# Patient Record
Sex: FEMALE | Race: WHITE | NOT HISPANIC OR LATINO | Employment: FULL TIME | URBAN - METROPOLITAN AREA
[De-identification: names, ages, dates, MRNs, and addresses within clinical notes are randomized per-mention and may not be internally consistent; named-entity substitution may affect disease eponyms.]

---

## 2017-04-03 ENCOUNTER — TRANSFERRED RECORDS (OUTPATIENT)
Dept: HEALTH INFORMATION MANAGEMENT | Facility: CLINIC | Age: 47
End: 2017-04-03

## 2017-04-03 LAB
HPV ABSTRACT: NORMAL
PAP-ABSTRACT: NORMAL

## 2017-11-06 ENCOUNTER — OFFICE VISIT (OUTPATIENT)
Dept: FAMILY MEDICINE | Facility: CLINIC | Age: 47
End: 2017-11-06
Payer: COMMERCIAL

## 2017-11-06 VITALS
HEART RATE: 67 BPM | HEIGHT: 63 IN | OXYGEN SATURATION: 96 % | SYSTOLIC BLOOD PRESSURE: 122 MMHG | RESPIRATION RATE: 18 BRPM | DIASTOLIC BLOOD PRESSURE: 77 MMHG | BODY MASS INDEX: 28.74 KG/M2 | TEMPERATURE: 98.4 F | WEIGHT: 162.2 LBS

## 2017-11-06 DIAGNOSIS — Z12.31 ENCOUNTER FOR SCREENING MAMMOGRAM FOR BREAST CANCER: ICD-10-CM

## 2017-11-06 DIAGNOSIS — Z12.83 SKIN CANCER SCREENING: ICD-10-CM

## 2017-11-06 DIAGNOSIS — Z13.6 CARDIOVASCULAR SCREENING; LDL GOAL LESS THAN 160: ICD-10-CM

## 2017-11-06 DIAGNOSIS — E03.9 HYPOTHYROIDISM, UNSPECIFIED TYPE: Primary | ICD-10-CM

## 2017-11-06 LAB
CHOLEST SERPL-MCNC: 210 MG/DL
GLUCOSE SERPL-MCNC: 79 MG/DL (ref 70–99)
HDLC SERPL-MCNC: 79 MG/DL
LDLC SERPL CALC-MCNC: 122 MG/DL
NONHDLC SERPL-MCNC: 131 MG/DL
TRIGL SERPL-MCNC: 43 MG/DL
TSH SERPL DL<=0.005 MIU/L-ACNC: 1.88 MU/L (ref 0.4–4)

## 2017-11-06 PROCEDURE — 99203 OFFICE O/P NEW LOW 30 MIN: CPT | Performed by: NURSE PRACTITIONER

## 2017-11-06 PROCEDURE — 80061 LIPID PANEL: CPT | Performed by: NURSE PRACTITIONER

## 2017-11-06 PROCEDURE — 82947 ASSAY GLUCOSE BLOOD QUANT: CPT | Performed by: NURSE PRACTITIONER

## 2017-11-06 PROCEDURE — 84443 ASSAY THYROID STIM HORMONE: CPT | Performed by: NURSE PRACTITIONER

## 2017-11-06 PROCEDURE — 36415 COLL VENOUS BLD VENIPUNCTURE: CPT | Performed by: NURSE PRACTITIONER

## 2017-11-06 RX ORDER — LEVOTHYROXINE SODIUM 112 UG/1
112 TABLET ORAL DAILY
COMMUNITY
Start: 2016-11-18 | End: 2017-11-06

## 2017-11-06 RX ORDER — LEVOTHYROXINE SODIUM 112 UG/1
112 TABLET ORAL DAILY
Qty: 90 TABLET | Refills: 3 | Status: SHIPPED | OUTPATIENT
Start: 2017-11-06 | End: 2018-12-20

## 2017-11-06 RX ORDER — LISINOPRIL 10 MG/1
10 TABLET ORAL DAILY
COMMUNITY
Start: 2016-11-18 | End: 2017-11-06

## 2017-11-06 NOTE — MR AVS SNAPSHOT
After Visit Summary   11/6/2017    Janine Beverly    MRN: [id number]           Patient Information     Date Of Birth          1970        Visit Information        Provider Department      11/6/2017 8:20 AM Natasha Ellis APRN CNP [facility]        Today's Diagnoses     Hypothyroidism, unspecified type    -  1    CARDIOVASCULAR SCREENING; LDL GOAL LESS THAN 160        Encounter for screening mammogram for breast cancer        Skin cancer screening           Follow-ups after your visit        Additional Services     DERMATOLOGY REFERRAL       Your provider has referred you to: [facility]: [facility] CHARLINE Mabry [phone]   [email or web address]    Please be aware that coverage of these services is subject to the terms and limitations of your health insurance plan.  Call member services at your health plan with any benefit or coverage questions.      Please bring the following with you to your appointment:    (1) Any X-Rays, CTs or MRIs which have been performed.  Contact the facility where they were done to arrange for  prior to your scheduled appointment.    (2) List of current medications  (3) This referral request   (4) Any documents/labs given to you for this referral                  Future tests that were ordered for you today     Open Future Orders        Priority Expected Expires Ordered    *MA Screening Digital Bilateral Routine  11/6/2018 11/6/2017            Who to contact     If you have questions or need follow up information about today's clinic visit or your schedule please contact [facility] directly at [phone].  Normal or non-critical lab and imaging results will be communicated to you by MyChart, letter or phone within 4 business days after the clinic has received the results. If you do not hear from us within 7 days, please contact the clinic through MyChart or phone. If you have a critical or abnormal lab  "result, we will notify you by phone as soon as possible.  Submit refill requests through Toplist or call your pharmacy and they will forward the refill request to us. Please allow 3 business days for your refill to be completed.          Additional Information About Your Visit        [facility] Information     Toplist lets you send messages to your doctor, view your test results, renew your prescriptions, schedule appointments and more. To sign up, go to [email or web address] . Click on \"Log in\" on the left side of the screen, which will take you to the Welcome page. Then click on \"Sign up Now\" on the right side of the page.     You will be asked to enter the access code listed below, as well as some personal information. Please follow the directions to create your username and password.     Your access code is: [email or web address]  Expires: 2018  8:48 AM     Your access code will  in 90 days. If you need help or a new code, please call your [place] clinic or [phone].        Care EveryWhere ID     This is your Care EveryWhere ID. This could be used by other organizations to access your [place] medical records  [id number]        Your Vitals Were     Pulse Temperature Respirations Height Pulse Oximetry BMI (Body Mass Index)    67 98.4  F (36.9  C) (Tympanic) 18 5' 3\" (1.6 m) 96% 28.73 kg/m2       Blood Pressure from Last 3 Encounters:   17 122/77   11 132/92   09 102/68    Weight from Last 3 Encounters:   17 162 lb 3.2 oz (73.6 kg)   11 155 lb (70.3 kg)              We Performed the Following     DERMATOLOGY REFERRAL     Glucose     Lipid panel reflex to direct LDL Fasting     TSH with free T4 reflex          Where to get your medicines      These medications were sent to [place] [id number] - [place], [place] -  [facility]   [address], [place] [address]     Phone:  [phone]     levothyroxine 112 MCG tablet          Primary Care Provider    Physician No Ref-Primary "       NO REF-PRIMARY PHYSICIAN        Equal Access to Services     NATIVIDAD GAMEZ : Hadii aad ku haddanielestrellita Coles, waaxda luqadaha, qaybta kaalmamalvin crockett, aleksandra baldwin. So [facility] [phone].    ATENCIÓN: Si habla español, tiene a pena disposición servicios gratuitos de asistencia lingüística. Llame al [phone].    We comply with applicable federal civil rights laws and [place] laws. We do not discriminate on the basis of race, color, national origin, age, disability, sex, sexual orientation, or gender identity.            Thank you!     Thank you for choosing [facility]  for your care. Our goal is always to provide you with excellent care. Hearing back from our patients is one way we can continue to improve our services. Please take a few minutes to complete the written survey that you may receive in the mail after your visit with us. Thank you!             Your Updated Medication List - Protect others around you: Learn how to safely use, store and throw away your medicines at www.disposemymeds.org.          This list is accurate as of: 11/6/17  8:48 AM.  Always use your most recent med list.                   Brand Name Dispense Instructions for use Diagnosis    levonorgestrel 20 MCG/24HR IUD    MIRENA     1 Device by Intrauterine route once        levothyroxine 112 MCG tablet    SYNTHROID    90 tablet    Take 1 tablet (112 mcg) by mouth daily    Hypothyroidism, unspecified type       lisinopril 10 MG tablet    PRINIVIL/ZESTRIL     1 TABLET DAILY

## 2017-11-06 NOTE — PROGRESS NOTES
"  SUBJECTIVE:   Janine Beverly is a 47 year old female who presents to clinic today for the following health issues:    New Patient/Transfer of Care - former care at [facility].  Had an insurance change.      Hypothyroid - has been diagnosed and stable on current dose for 8+ years.  Feeling well.  No symptoms or side effects.  Has not missed any doses.      Would like to get refills and referral for mammo    Pap with HPV (negative) done 10/2017 with mirena IUD insertion.      Due for fasting labs.      Problem list and histories reviewed & adjusted, as indicated.  Additional history: as documented    Reviewed and updated as needed this visit by clinical staff  Tobacco  Allergies  Meds  Problems  Med Hx  Surg Hx  Fam Hx  Soc Hx        Reviewed and updated as needed this visit by Provider  Tobacco  Allergies  Meds  Problems  Soc Hx        ROS:  Constitutional, HEENT, cardiovascular, pulmonary, gi and gu systems are negative, except as otherwise noted.      OBJECTIVE:   /77 (BP Location: Left arm, Patient Position: Chair, Cuff Size: Adult Regular)  Pulse 67  Temp 98.4  F (36.9  C) (Tympanic)  Resp 18  Ht 5' 3\" (1.6 m)  Wt 162 lb 3.2 oz (73.6 kg)  SpO2 96%  BMI 28.73 kg/m2  Body mass index is 28.73 kg/(m^2).  GENERAL: healthy, alert and no distress  EYES: Eyes grossly normal to inspection, PERRL and conjunctivae and sclerae normal  HENT: ear canals and TM's normal, nose and mouth without ulcers or lesions  NECK: no adenopathy, no asymmetry, masses, or scars and thyroid normal to palpation  RESP: lungs clear to auscultation - no rales, rhonchi or wheezes  CV: regular rate and rhythm, normal S1 S2, no S3 or S4, no murmur, click or rub, no peripheral edema and peripheral pulses strong  ABDOMEN: soft, nontender, no hepatosplenomegaly, no masses and bowel sounds normal  MS: no gross musculoskeletal defects noted, no edema  SKIN: no suspicious lesions or rashes    Diagnostic Test " Results:  Results for orders placed or performed in visit on 11/06/17   TSH with free T4 reflex   Result Value Ref Range    TSH 1.88 0.40 - 4.00 mU/L   Lipid panel reflex to direct LDL Fasting   Result Value Ref Range    Cholesterol 210 (H) <200 mg/dL    Triglycerides 43 <150 mg/dL    HDL Cholesterol 79 >49 mg/dL    LDL Cholesterol Calculated 122 (H) <100 mg/dL    Non HDL Cholesterol 131 (H) <130 mg/dL   Glucose   Result Value Ref Range    Glucose 79 70 - 99 mg/dL       ASSESSMENT/PLAN:     Hypothyroidism; controlled/euthyroid   Plan:  No changes in the patient's current treatment plan          ICD-10-CM    1. Hypothyroidism, unspecified type E03.9 levothyroxine (SYNTHROID) 112 MCG tablet     TSH with free T4 reflex   2. CARDIOVASCULAR SCREENING; LDL GOAL LESS THAN 160 Z13.6 Lipid panel reflex to direct LDL Fasting     Glucose   3. Encounter for screening mammogram for breast cancer Z12.31 *MA Screening Digital Bilateral   4. Skin cancer screening Z12.83 DERMATOLOGY REFERRAL     TSH at goal.  meds refilled x 1 year.  F/u  Sooner if worsening.      LDL and glucose at goal.      mammo recently done at [facility].      History of atyp nevus.  Would like to see derm for full skin check.    See Patient Instructions    NADIA Reyes [facility]

## 2017-11-06 NOTE — Clinical Note
Please abstract the following data from this visit with this patient into the appropriate field in Epic:  Pap smear done on this date: 04/2017 (approximately), by this group: [facility], results were Normal.

## 2017-11-08 ENCOUNTER — RADIANT APPOINTMENT (OUTPATIENT)
Dept: MAMMOGRAPHY | Facility: CLINIC | Age: 47
End: 2017-11-08
Attending: NURSE PRACTITIONER
Payer: COMMERCIAL

## 2017-11-08 DIAGNOSIS — Z12.31 ENCOUNTER FOR SCREENING MAMMOGRAM FOR BREAST CANCER: ICD-10-CM

## 2017-11-08 PROCEDURE — G0202 SCR MAMMO BI INCL CAD: HCPCS

## 2017-12-13 DIAGNOSIS — I10 ESSENTIAL HYPERTENSION WITH GOAL BLOOD PRESSURE LESS THAN 140/90: Primary | ICD-10-CM

## 2017-12-13 NOTE — TELEPHONE ENCOUNTER
lov 17  LISINOPRIL 10 MG OR TABS     --   Si TABLET DAILY   Class: Historical     kecia espinoza

## 2017-12-18 RX ORDER — LISINOPRIL 10 MG/1
TABLET ORAL
Qty: 90 TABLET | Refills: 0 | Status: SHIPPED | OUTPATIENT
Start: 2017-12-18 | End: 2018-03-19

## 2017-12-18 NOTE — TELEPHONE ENCOUNTER
Pt reported historical- lisinopril- but we got rx info from [facility]  Please sign off on lisinopril-    [facility] Received Sig: Take 1 tablet by mouth once daily.

## 2018-04-03 PROBLEM — E03.4 HYPOTHYROIDISM DUE TO ACQUIRED ATROPHY OF THYROID: Status: ACTIVE | Noted: 2018-04-03

## 2018-04-05 DIAGNOSIS — I10 ESSENTIAL HYPERTENSION WITH GOAL BLOOD PRESSURE LESS THAN 140/90: ICD-10-CM

## 2018-04-05 LAB
CREAT SERPL-MCNC: 0.72 MG/DL (ref 0.52–1.04)
GFR SERPL CREATININE-BSD FRML MDRD: 86 ML/MIN/1.7M2
POTASSIUM SERPL-SCNC: 3.8 MMOL/L (ref 3.4–5.3)

## 2018-04-05 PROCEDURE — 36415 COLL VENOUS BLD VENIPUNCTURE: CPT | Performed by: NURSE PRACTITIONER

## 2018-04-05 PROCEDURE — 82565 ASSAY OF CREATININE: CPT | Performed by: NURSE PRACTITIONER

## 2018-04-05 PROCEDURE — 84132 ASSAY OF SERUM POTASSIUM: CPT | Performed by: NURSE PRACTITIONER

## 2018-04-17 ENCOUNTER — OFFICE VISIT (OUTPATIENT)
Dept: FAMILY MEDICINE | Facility: CLINIC | Age: 48
End: 2018-04-17
Payer: COMMERCIAL

## 2018-04-17 VITALS
HEIGHT: 63 IN | HEART RATE: 54 BPM | OXYGEN SATURATION: 100 % | SYSTOLIC BLOOD PRESSURE: 121 MMHG | BODY MASS INDEX: 29.08 KG/M2 | WEIGHT: 164.13 LBS | RESPIRATION RATE: 16 BRPM | DIASTOLIC BLOOD PRESSURE: 79 MMHG | TEMPERATURE: 97 F

## 2018-04-17 DIAGNOSIS — M77.11 LATERAL EPICONDYLITIS OF RIGHT ELBOW: Primary | ICD-10-CM

## 2018-04-17 PROCEDURE — 99213 OFFICE O/P EST LOW 20 MIN: CPT | Performed by: FAMILY MEDICINE

## 2018-04-17 NOTE — MR AVS SNAPSHOT
"              After Visit Summary   2018    Janine Beverly    MRN: [id number]           Patient Information     Date Of Birth          1970        Visit Information        Provider Department      2018 3:20 PM Tsernig Choi MD [facility]        Today's Diagnoses     Lateral epicondylitis of right elbow    -  1       Follow-ups after your visit        Follow-up notes from your care team     Return if symptoms worsen or fail to improve.      Who to contact     If you have questions or need follow up information about today's clinic visit or your schedule please contact [facility] directly at [phone].  Normal or non-critical lab and imaging results will be communicated to you by MyChart, letter or phone within 4 business days after the clinic has received the results. If you do not hear from us within 7 days, please contact the clinic through MyChart or phone. If you have a critical or abnormal lab result, we will notify you by phone as soon as possible.  Submit refill requests through PatientKeeper or call your pharmacy and they will forward the refill request to us. Please allow 3 business days for your refill to be completed.          Additional Information About Your Visit        MyChart Information     PatientKeeper lets you send messages to your doctor, view your test results, renew your prescriptions, schedule appointments and more. To sign up, go to [email or web address] . Click on \"Log in\" on the left side of the screen, which will take you to the Welcome page. Then click on \"Sign up Now\" on the right side of the page.     You will be asked to enter the access code listed below, as well as some personal information. Please follow the directions to create your username and password.     Your access code is: [email or web address]  Expires: 2018  6:29 PM     Your access code will  in 90 days. If you need help or a new code, please call your " "[facility] or [phone].        Care EveryWhere ID     This is your Care EveryWhere ID. This could be used by other organizations to access your [place] medical records  [id number]        Your Vitals Were     Pulse Temperature Respirations Height Last Period Pulse Oximetry    54 97  F (36.1  C) (Oral) 16 5' 3\" (1.6 m) (LMP Unknown) 100%    Breastfeeding? BMI (Body Mass Index)                No 29.07 kg/m2           Blood Pressure from Last 3 Encounters:   04/17/18 121/79   11/06/17 122/77   11/13/11 132/92    Weight from Last 3 Encounters:   04/17/18 164 lb 2 oz (74.4 kg)   11/06/17 162 lb 3.2 oz (73.6 kg)   11/13/11 155 lb (70.3 kg)              Today, you had the following     No orders found for display       Primary Care Provider Office Phone # Fax #    Natasha Agarwal NADIA Ellis [facility] [phone] [address] [facility] [address]        Equal Access to Services     [facility]: Hadii aad ku hadasho Soomaali, waaxda luqadaha, qaybta kaalmada adeegyada, aleksandra eugene . So [facility] [phone].    ATENCIÓN: Si habla español, tiene a pena disposición servicios gratuitos de asistencia lingüística. [facility] [phone].    We comply with applicable federal civil rights laws and [place] laws. We do not discriminate on the basis of race, color, national origin, age, disability, sex, sexual orientation, or gender identity.            Thank you!     Thank you for choosing [facility]  for your care. Our goal is always to provide you with excellent care. Hearing back from our patients is one way we can continue to improve our services. Please take a few minutes to complete the written survey that you may receive in the mail after your visit with us. Thank you!             Your Updated Medication List - Protect others around you: Learn how to safely use, store and throw away your medicines at www.disposemymeds.org.          This list is " accurate as of 4/17/18 11:59 PM.  Always use your most recent med list.                   Brand Name Dispense Instructions for use Diagnosis    levonorgestrel 20 MCG/24HR IUD    MIRENA     1 Device by Intrauterine route once        levothyroxine 112 MCG tablet    SYNTHROID    90 tablet    Take 1 tablet (112 mcg) by mouth daily    Hypothyroidism, unspecified type       lisinopril 10 MG tablet    PRINIVIL/ZESTRIL    90 tablet    Take 1 tablet (10 mg) by mouth daily    Essential hypertension with goal blood pressure less than 140/90

## 2018-04-17 NOTE — PROGRESS NOTES
SUBJECTIVE:   Janine Beverly is a 47 year old female who presents to clinic today for the following health issues:    1. Pain in right elbow x 6 weeks, no known cause.     Presents with increased pain with twisting and turning motion of RIGHT forearm with pain- denies any injury or trauma.  Thinks it may have been called while exercising-- swims and lifts free weights and feels she may have done a lot of breast stroke movements with her arms just before this pain first started.  She has not tried any brace and is not using NSAIDs regularly.  Denies any swelling around elbow or skin changes.  No weakness or radicular symptoms into the hand.    Problem list and histories reviewed & adjusted, as indicated.  Additional history: as documented    Patient Active Problem List   Diagnosis     CARDIOVASCULAR SCREENING; LDL GOAL LESS THAN 160     Hypothyroidism due to acquired atrophy of thyroid     Past Surgical History:   Procedure Laterality Date     APPENDECTOMY  07/2003       Social History   Substance Use Topics     Smoking status: Never Smoker     Smokeless tobacco: Never Used     Alcohol use Yes     Family History   Problem Relation Age of Onset     Other - See Comments Mother 55     sarcoma     Breast Cancer Paternal Grandmother          Current Outpatient Prescriptions   Medication Sig Dispense Refill     lisinopril (PRINIVIL/ZESTRIL) 10 MG tablet Take 1 tablet (10 mg) by mouth daily 90 tablet 0     levonorgestrel (MIRENA) 20 MCG/24HR IUD 1 Device by Intrauterine route once       levothyroxine (SYNTHROID) 112 MCG tablet Take 1 tablet (112 mcg) by mouth daily 90 tablet 3     Allergies   Allergen Reactions     Penicillins Rash     BP Readings from Last 3 Encounters:   04/17/18 121/79   11/06/17 122/77   11/13/11 132/92    Wt Readings from Last 3 Encounters:   04/17/18 164 lb 2 oz (74.4 kg)   11/06/17 162 lb 3.2 oz (73.6 kg)   11/13/11 155 lb (70.3 kg)                    Reviewed and updated as needed this visit by  "clinical staff  Tobacco  Allergies  Meds  Med Hx  Surg Hx  Fam Hx  Soc Hx      Reviewed and updated as needed this visit by Provider         ROS:  Constitutional, HEENT, cardiovascular, pulmonary, gi and gu systems are negative, except as otherwise noted.    OBJECTIVE:     /79  Pulse 54  Temp 97  F (36.1  C) (Oral)  Resp 16  Ht 5' 3\" (1.6 m)  Wt 164 lb 2 oz (74.4 kg)  LMP  (LMP Unknown)  SpO2 100%  Breastfeeding? No  BMI 29.07 kg/m2  Body mass index is 29.07 kg/(m^2).  GENERAL: healthy, alert and no distress  EYES: Eyes grossly normal to inspection, PERRL and conjunctivae and sclerae normal  NECK: no adenopathy, no asymmetry, masses, or scars and thyroid normal to palpation  MS: no gross musculoskeletal defects noted, no edema, pain with twisting of right hand inverting against eversion of my handshake pressure with pain localized to the lateral epicondyle and forearm muscles just medially, no swelling or skin changes noted.  Normal  and strength.  SKIN: no suspicious lesions or rashes  NEURO: Normal strength and tone, mentation intact and speech normal  PSYCH: mentation appears normal, affect normal/bright    Diagnostic Test Results:  none     ASSESSMENT/PLAN:     1. Lateral epicondylitis of right elbow    Recommend tennis elbow brace and NSAIDS/ICE and rest over next 2-4 weeks until symptoms resolve.  Recommend avoiding repetitive activities with RUE which exacerbate the pain like twisting caps/lids and carrying heavy jugs of milk or water.      FU if no change or worsening symptoms prn.    Tsering Choi MD  [facility]  "

## 2018-06-16 DIAGNOSIS — I10 ESSENTIAL HYPERTENSION WITH GOAL BLOOD PRESSURE LESS THAN 140/90: ICD-10-CM

## 2018-06-17 NOTE — TELEPHONE ENCOUNTER
"Requested Prescriptions   Pending Prescriptions Disp Refills     lisinopril (PRINIVIL/ZESTRIL) 10 MG tablet [Pharmacy Med Name: LISINOPRIL 10 MG TABLET]      Last Written Prescription Date:  3/22/2018  Last Fill Quantity: 90 tablets   ,  # refills: 0   Last Office Visit: 4/17/2018   Future Office Visit:      90 tablet 0     Sig: TAKE 1 TABLET (10 MG) BY MOUTH DAILY    ACE Inhibitors (Including Combos) Protocol Passed    6/16/2018 12:34 PM       Passed - Blood pressure under 140/90 in past 12 months    BP Readings from Last 3 Encounters:   04/17/18 121/79   11/06/17 122/77   11/13/11 132/92          Passed - Recent (12 mo) or future (30 days) visit within the authorizing provider's specialty    Patient had office visit in the last 12 months or has a visit in the next 30 days with authorizing provider or within the authorizing provider's specialty.  See \"Patient Info\" tab in inbasket, or \"Choose Columns\" in Meds & Orders section of the refill encounter.           Passed - Patient is age 18 or older       Passed - No active pregnancy on record       Passed - Normal serum creatinine on file in past 12 months    Recent Labs   Lab Test  04/05/18   0836   CR  0.72          Passed - Normal serum potassium on file in past 12 months    Recent Labs   Lab Test  04/05/18   0836   POTASSIUM  3.8          Passed - No positive pregnancy test in past 12 months          "

## 2018-06-19 RX ORDER — LISINOPRIL 10 MG/1
TABLET ORAL
Qty: 90 TABLET | Refills: 0 | Status: SHIPPED | OUTPATIENT
Start: 2018-06-19 | End: 2018-06-29

## 2018-06-29 DIAGNOSIS — I10 ESSENTIAL HYPERTENSION WITH GOAL BLOOD PRESSURE LESS THAN 140/90: ICD-10-CM

## 2018-06-29 RX ORDER — LISINOPRIL 10 MG/1
TABLET ORAL
Qty: 90 TABLET | Refills: 0 | Status: SHIPPED | OUTPATIENT
Start: 2018-06-29 | End: 2018-12-21

## 2018-06-29 NOTE — TELEPHONE ENCOUNTER
Calling stating she did not get enough refills until her yearly visit in Nov 2018. Refills sent into pharmacy.  Ramona Hugo RN

## 2018-12-20 DIAGNOSIS — E03.9 HYPOTHYROIDISM, UNSPECIFIED TYPE: ICD-10-CM

## 2018-12-21 DIAGNOSIS — I10 ESSENTIAL HYPERTENSION WITH GOAL BLOOD PRESSURE LESS THAN 140/90: ICD-10-CM

## 2018-12-21 NOTE — TELEPHONE ENCOUNTER
"Requested Prescriptions   Pending Prescriptions Disp Refills     SYNTHROID 112 MCG tablet [Pharmacy Med Name: SYNTHROID 112 MCG TABLET]  Last Written Prescription Date:  11-6-17  Last Fill Quantity: 90 tab,  # refills: 3   Last office visit: 4/17/2018 with prescribing provider:  ALICE TOSCANO    Future Office Visit:    90 tablet 3     Sig: TAKE 1 TABLET (112 MCG) BY MOUTH DAILY    Thyroid Protocol Failed - 12/20/2018  1:52 AM       Failed - Normal TSH on file in past 12 months    Recent Labs   Lab Test 11/06/17  0851   TSH 1.88          Passed - Patient is 12 years or older       Passed - Recent (12 mo) or future (30 days) visit within the authorizing provider's specialty    Patient had office visit in the last 12 months or has a visit in the next 30 days with authorizing provider or within the authorizing provider's specialty.  See \"Patient Info\" tab in inbasket, or \"Choose Columns\" in Meds & Orders section of the refill encounter.           Passed - No active pregnancy on record    If patient is pregnant or has had a positive pregnancy test, please check TSH.       Passed - No positive pregnancy test in past 12 months    If patient is pregnant or has had a positive pregnancy test, please check TSH.          "

## 2018-12-22 NOTE — TELEPHONE ENCOUNTER
"Requested Prescriptions   Pending Prescriptions Disp Refills     lisinopril (PRINIVIL/ZESTRIL) 10 MG tablet [Pharmacy Med Name: LISINOPRIL 10 MG TABLET]  Last Written Prescription Date:  6/29/2018  Last Fill Quantity: 90 tabs,  # refills: 0   Last office visit: 4/17/2018 with prescribing provider:  Rhonda   Future Office Visit:     90 tablet 0     Sig: TAKE 1 TABLET BY MOUTH EVERY DAY    ACE Inhibitors (Including Combos) Protocol Passed - 12/21/2018  1:50 AM       Passed - Blood pressure under 140/90 in past 12 months    BP Readings from Last 3 Encounters:   04/17/18 121/79   11/06/17 122/77   11/13/11 132/92                Passed - Recent (12 mo) or future (30 days) visit within the authorizing provider's specialty    Patient had office visit in the last 12 months or has a visit in the next 30 days with authorizing provider or within the authorizing provider's specialty.  See \"Patient Info\" tab in inbasket, or \"Choose Columns\" in Meds & Orders section of the refill encounter.             Passed - Patient is age 18 or older       Passed - No active pregnancy on record       Passed - Normal serum creatinine on file in past 12 months    Recent Labs   Lab Test 04/05/18  0836   CR 0.72            Passed - Normal serum potassium on file in past 12 months    Recent Labs   Lab Test 04/05/18  0836   POTASSIUM 3.8            Passed - No positive pregnancy test in past 12 months          "

## 2018-12-23 RX ORDER — LISINOPRIL 10 MG/1
TABLET ORAL
Qty: 90 TABLET | Refills: 0 | Status: SHIPPED | OUTPATIENT
Start: 2018-12-23 | End: 2019-01-04

## 2018-12-23 RX ORDER — LEVOTHYROXINE SODIUM 112 MCG
TABLET ORAL
Qty: 30 TABLET | Refills: 0 | Status: SHIPPED | OUTPATIENT
Start: 2018-12-23 | End: 2019-01-04

## 2018-12-23 NOTE — TELEPHONE ENCOUNTER
Prescription approved per [facility] Refill Protocol.  GRACIELA Joshua, BSN, RN

## 2018-12-23 NOTE — TELEPHONE ENCOUNTER
One month refill only as patient overdue for thyroid lab check.    Lab ordered.    Team coordinators-Please contact patient to schedule non-fasting lab appt.    Thank you!  ARPIT YañezN, RN

## 2018-12-24 NOTE — TELEPHONE ENCOUNTER
Left message to call back. 1 month refill approved, patient is due for non-fasting lab for further refills.    Erendira Leong

## 2019-01-04 ENCOUNTER — OFFICE VISIT (OUTPATIENT)
Dept: FAMILY MEDICINE | Facility: CLINIC | Age: 49
End: 2019-01-04
Payer: COMMERCIAL

## 2019-01-04 VITALS
TEMPERATURE: 97.6 F | HEART RATE: 57 BPM | SYSTOLIC BLOOD PRESSURE: 105 MMHG | DIASTOLIC BLOOD PRESSURE: 67 MMHG | RESPIRATION RATE: 16 BRPM | HEIGHT: 63 IN | BODY MASS INDEX: 28.74 KG/M2 | WEIGHT: 162.2 LBS

## 2019-01-04 DIAGNOSIS — E03.9 HYPOTHYROIDISM, UNSPECIFIED TYPE: ICD-10-CM

## 2019-01-04 DIAGNOSIS — Z00.00 WELL FEMALE EXAM WITHOUT GYNECOLOGICAL EXAM: Primary | ICD-10-CM

## 2019-01-04 DIAGNOSIS — I10 ESSENTIAL HYPERTENSION WITH GOAL BLOOD PRESSURE LESS THAN 140/90: ICD-10-CM

## 2019-01-04 DIAGNOSIS — Z23 NEED FOR PROPHYLACTIC VACCINATION AND INOCULATION AGAINST INFLUENZA: ICD-10-CM

## 2019-01-04 PROCEDURE — 99396 PREV VISIT EST AGE 40-64: CPT | Mod: 25 | Performed by: NURSE PRACTITIONER

## 2019-01-04 PROCEDURE — 90471 IMMUNIZATION ADMIN: CPT | Performed by: NURSE PRACTITIONER

## 2019-01-04 PROCEDURE — 36415 COLL VENOUS BLD VENIPUNCTURE: CPT | Performed by: NURSE PRACTITIONER

## 2019-01-04 PROCEDURE — 80048 BASIC METABOLIC PNL TOTAL CA: CPT | Performed by: NURSE PRACTITIONER

## 2019-01-04 PROCEDURE — 90686 IIV4 VACC NO PRSV 0.5 ML IM: CPT | Performed by: NURSE PRACTITIONER

## 2019-01-04 PROCEDURE — 84443 ASSAY THYROID STIM HORMONE: CPT | Performed by: NURSE PRACTITIONER

## 2019-01-04 PROCEDURE — 80061 LIPID PANEL: CPT | Performed by: NURSE PRACTITIONER

## 2019-01-04 PROCEDURE — 82043 UR ALBUMIN QUANTITATIVE: CPT | Performed by: NURSE PRACTITIONER

## 2019-01-04 RX ORDER — LISINOPRIL 10 MG/1
TABLET ORAL
Qty: 90 TABLET | Refills: 3 | Status: SHIPPED | OUTPATIENT
Start: 2019-01-04 | End: 2020-02-04

## 2019-01-04 RX ORDER — LEVOTHYROXINE SODIUM 112 MCG
TABLET ORAL
Qty: 90 TABLET | Refills: 3 | Status: SHIPPED | OUTPATIENT
Start: 2019-01-04 | End: 2020-02-02

## 2019-01-04 ASSESSMENT — ENCOUNTER SYMPTOMS
NAUSEA: 0
MYALGIAS: 0
NERVOUS/ANXIOUS: 0
JOINT SWELLING: 0
CONSTIPATION: 0
EYE PAIN: 0
HEADACHES: 0
HEMATOCHEZIA: 0
WEAKNESS: 0
FREQUENCY: 0
ARTHRALGIAS: 0
HEMATURIA: 0
PALPITATIONS: 0
SHORTNESS OF BREATH: 0
CHILLS: 0
HEARTBURN: 0
BREAST MASS: 0
DIARRHEA: 0
FEVER: 0
SORE THROAT: 0
PARESTHESIAS: 0
DIZZINESS: 0
DYSURIA: 0
ABDOMINAL PAIN: 0
COUGH: 0

## 2019-01-04 ASSESSMENT — MIFFLIN-ST. JEOR: SCORE: 1333.82

## 2019-01-04 NOTE — PROGRESS NOTES
SUBJECTIVE:   CC: Janine Beverly is an 48 year old woman who presents for preventive health visit.     Physical   Annual:     Getting at least 3 servings of Calcium per day:  NO    Bi-annual eye exam:  Yes    Dental care twice a year:  Yes    Sleep apnea or symptoms of sleep apnea:  None    Diet:  Regular (no restrictions)    Frequency of exercise:  6-7 days/week    Duration of exercise:  45-60 minutes    Taking medications regularly:  Yes    Medication side effects:  None    Additional concerns today:  Yes (left shoulder pain [id number])    PHQ-2 Total Score: 0      Today's PHQ-2 Score:   PHQ-2 ( 1999 Pfizer) 1/4/2019   Q1: Little interest or pleasure in doing things 0   Q2: Feeling down, depressed or hopeless 0   PHQ-2 Score 0   Q1: Little interest or pleasure in doing things Not at all   Q2: Feeling down, depressed or hopeless Not at all   PHQ-2 Score 0       Abuse: Current or Past(Physical, Sexual or Emotional)- No  Do you feel safe in your environment? Yes    Social History     Tobacco Use     Smoking status: Never Smoker     Smokeless tobacco: Never Used   Substance Use Topics     Alcohol use: Yes     Alcohol Use 1/4/2019   If you drink alcohol do you typically have greater than 3 drinks per day OR greater than 7 drinks per week? No     Reviewed orders with patient.  Reviewed health maintenance and updated orders accordingly - Yes    Mammogram Screening: Patient under age 50, mutual decision reflected in health maintenance.      Pertinent mammograms are reviewed under the imaging tab.  History of abnormal Pap smear: NO - age 30-65 PAP every 5 years with negative HPV co-testing recommended     Reviewed and updated as needed this visit by clinical staff  Tobacco  Allergies  Meds  Problems  Med Hx  Surg Hx  Fam Hx  Soc Hx          Reviewed and updated as needed this visit by Provider  Tobacco  Allergies  Meds  Problems  Med Hx  Surg Hx  Fam Hx          Review of Systems   Constitutional: Negative  "for chills and fever.   HENT: Negative for congestion, ear pain, hearing loss and sore throat.    Eyes: Negative for pain and visual disturbance.   Respiratory: Negative for cough and shortness of breath.    Cardiovascular: Negative for chest pain, palpitations and peripheral edema.   Gastrointestinal: Negative for abdominal pain, constipation, diarrhea, heartburn, hematochezia and nausea.   Breasts:  Negative for tenderness, breast mass and discharge.   Genitourinary: Negative for dysuria, frequency, genital sores, hematuria, pelvic pain, urgency, vaginal bleeding and vaginal discharge.   Musculoskeletal: Negative for arthralgias, joint swelling and myalgias.   Skin: Negative for rash.   Neurological: Negative for dizziness, weakness, headaches and paresthesias.   Psychiatric/Behavioral: Negative for mood changes. The patient is not nervous/anxious.        OBJECTIVE:   /67   Pulse 57   Temp 97.6  F (36.4  C) (Oral)   Resp 16   Ht 1.607 m (5' 3.25\")   Wt 73.6 kg (162 lb 3.2 oz)   BMI 28.51 kg/m    Physical Exam  GENERAL: healthy, alert and no distress  EYES: Eyes grossly normal to inspection, PERRL and conjunctivae and sclerae normal  HENT: ear canals and TM's normal, nose and mouth without ulcers or lesions  NECK: no adenopathy, no asymmetry, masses, or scars and thyroid normal to palpation  RESP: lungs clear to auscultation - no rales, rhonchi or wheezes  BREAST: normal without masses, tenderness or nipple discharge and no palpable axillary masses or adenopathy  CV: regular rate and rhythm, normal S1 S2, no S3 or S4, no murmur, click or rub, no peripheral edema and peripheral pulses strong  ABDOMEN: soft, nontender, no hepatosplenomegaly, no masses and bowel sounds normal  MS: no gross musculoskeletal defects noted, no edema  SKIN: no suspicious lesions or rashes  NEURO: Normal strength and tone, mentation intact and speech normal  PSYCH: mentation appears normal, affect " "normal/bright      ASSESSMENT/PLAN:       ICD-10-CM    1. Well female exam without gynecological exam Z00.00 Lipid panel reflex to direct LDL Non-fasting   2. Essential hypertension with goal blood pressure less than 140/90 I10 lisinopril (PRINIVIL/ZESTRIL) 10 MG tablet     Basic metabolic panel  (Ca, Cl, CO2, Creat, Gluc, K, Na, BUN)     Albumin Random Urine Quantitative with Creat Ratio   3. Hypothyroidism, unspecified type E03.9 SYNTHROID 112 MCG tablet     TSH with free T4 reflex   4. Need for prophylactic vaccination and inoculation against influenza Z23 Vaccine Administration, Initial [18298]     FLU VACCINE, SPLIT VIRUS, IM (QUADRIVALENT) [72627]- >3 YRS      well female, not fasting for labs.  Encouraged to continue exercise and healthy diet choices.      Discussed sodium restriction, maintaining ideal body weight and regular exercise program as physiologic means to achieve blood pressure control. The patient will strive towards this.  The patient indicates understanding of these issues and agrees with the plan. Side effects explained in detail. Continue home readings and return in 12 months.  Discussed long term complications of untreated htn.    Thyroid feels stable.  Same dose for many years.  Will refill at current dose.  F/u if TSH abnormal.    Flu shot given.    COUNSELING:  Reviewed preventive health counseling, as reflected in patient instructions    BP Readings from Last 1 Encounters:   01/04/19 105/67     Estimated body mass index is 28.51 kg/m  as calculated from the following:    Height as of this encounter: 1.607 m (5' 3.25\").    Weight as of this encounter: 73.6 kg (162 lb 3.2 oz).      Weight management plan: Specific weight management program called whole 30 discussed     reports that  has never smoked. she has never used smokeless tobacco.      Counseling Resources:  ATP IV Guidelines  Pooled Cohorts Equation Calculator  Breast Cancer Risk Calculator  FRAX Risk Assessment  ICSI Preventive " Guidelines  Dietary Guidelines for Americans, 2010  USDA's MyPlate  ASA Prophylaxis  Lung CA Screening    NADIA Reyes CNP  [facility]  Injectable Influenza Immunization Documentation    1.  Is the person to be vaccinated sick today?   No    2. Does the person to be vaccinated have an allergy to a component   of the vaccine?   No  Egg Allergy Algorithm Link    3. Has the person to be vaccinated ever had a serious reaction   to influenza vaccine in the past?   No    4. Has the person to be vaccinated ever had Guillain-Barré syndrome?   No    Form completed by Becky Najera MA

## 2019-01-05 LAB
ANION GAP SERPL CALCULATED.3IONS-SCNC: 6 MMOL/L (ref 3–14)
BUN SERPL-MCNC: 23 MG/DL (ref 7–30)
CALCIUM SERPL-MCNC: 8.8 MG/DL (ref 8.5–10.1)
CHLORIDE SERPL-SCNC: 102 MMOL/L (ref 94–109)
CHOLEST SERPL-MCNC: 268 MG/DL
CO2 SERPL-SCNC: 26 MMOL/L (ref 20–32)
CREAT SERPL-MCNC: 0.92 MG/DL (ref 0.52–1.04)
CREAT UR-MCNC: 101 MG/DL
GFR SERPL CREATININE-BSD FRML MDRD: 73 ML/MIN/{1.73_M2}
GLUCOSE SERPL-MCNC: 84 MG/DL (ref 70–99)
HDLC SERPL-MCNC: 76 MG/DL
LDLC SERPL CALC-MCNC: 176 MG/DL
MICROALBUMIN UR-MCNC: 6 MG/L
MICROALBUMIN/CREAT UR: 5.73 MG/G CR (ref 0–25)
NONHDLC SERPL-MCNC: 192 MG/DL
POTASSIUM SERPL-SCNC: 4.2 MMOL/L (ref 3.4–5.3)
SODIUM SERPL-SCNC: 134 MMOL/L (ref 133–144)
TRIGL SERPL-MCNC: 78 MG/DL
TSH SERPL DL<=0.005 MIU/L-ACNC: 2.07 MU/L (ref 0.4–4)

## 2019-03-14 ENCOUNTER — ANCILLARY PROCEDURE (OUTPATIENT)
Dept: MAMMOGRAPHY | Facility: CLINIC | Age: 49
End: 2019-03-14
Attending: NURSE PRACTITIONER
Payer: COMMERCIAL

## 2019-03-14 DIAGNOSIS — Z12.31 SCREENING MAMMOGRAM, ENCOUNTER FOR: ICD-10-CM

## 2020-01-29 DIAGNOSIS — E03.9 HYPOTHYROIDISM, UNSPECIFIED TYPE: ICD-10-CM

## 2020-01-30 NOTE — TELEPHONE ENCOUNTER
"Requested Prescriptions   Pending Prescriptions Disp Refills     SYNTHROID 112 MCG tablet [Pharmacy Med Name: SYNTHROID 112 MCG TABLET] 90 tablet 3     Sig: TAKE 1 TABLET (112 MCG) BY MOUTH DAILY      Last Written Prescription Date:  1/4/2019  Last Fill Quantity: 90 tablet    ,  # refills: 3   Last Office Visit: 1/4/2019   Future Office Visit:    Next 5 appointments (look out 90 days)    Feb 04, 2020  9:00 AM CST  PHYSICAL with NADIA Reyes CNP  [facility] ([facility]) [address] [place] [address]  [phone]             Thyroid Protocol Failed - 1/29/2020  2:24 AM        Failed - Normal TSH on file in past 12 months     Recent Labs   Lab Test 01/04/19  1447   TSH 2.07           Passed - Patient is 12 years or older        Passed - Recent (12 mo) or future (30 days) visit within the authorizing provider's specialty     Patient has had an office visit with the authorizing provider or a provider within the authorizing providers department within the previous 12 mos or has a future within next 30 days. See \"Patient Info\" tab in inbasket, or \"Choose Columns\" in Meds & Orders section of the refill encounter.          Passed - Medication is active on med list        Passed - No active pregnancy on record     If patient is pregnant or has had a positive pregnancy test, please check TSH.        Passed - No positive pregnancy test in past 12 months     If patient is pregnant or has had a positive pregnancy test, please check TSH.          "

## 2020-02-02 RX ORDER — LEVOTHYROXINE SODIUM 112 MCG
TABLET ORAL
Qty: 30 TABLET | Refills: 0 | Status: SHIPPED | OUTPATIENT
Start: 2020-02-02 | End: 2020-02-04

## 2020-02-03 NOTE — TELEPHONE ENCOUNTER
HP Reception Medication is being filled for 1 time refill only due to:  Patient needs to be seen because it has been more than one year since last visit.     Signed Prescriptions:                        Disp   Refills    SYNTHROID 112 MCG tablet                   30 tab*0        Sig: TAKE 1 TABLET (112 MCG) BY MOUTH DAILY  Authorizing Provider: YADI GODWIN  Ordering User: WILDER LEE

## 2020-02-03 NOTE — TELEPHONE ENCOUNTER
Patient is scheduled to see PCP tomorrow & can get labs done then.    Erendira ABBASI     [facility]

## 2020-02-04 ENCOUNTER — OFFICE VISIT (OUTPATIENT)
Dept: FAMILY MEDICINE | Facility: CLINIC | Age: 50
End: 2020-02-04
Payer: COMMERCIAL

## 2020-02-04 VITALS
WEIGHT: 171.2 LBS | BODY MASS INDEX: 30.33 KG/M2 | SYSTOLIC BLOOD PRESSURE: 123 MMHG | OXYGEN SATURATION: 100 % | DIASTOLIC BLOOD PRESSURE: 81 MMHG | RESPIRATION RATE: 16 BRPM | TEMPERATURE: 98.2 F | HEART RATE: 49 BPM | HEIGHT: 63 IN

## 2020-02-04 DIAGNOSIS — E03.9 HYPOTHYROIDISM, UNSPECIFIED TYPE: ICD-10-CM

## 2020-02-04 DIAGNOSIS — Z23 NEED FOR PROPHYLACTIC VACCINATION AND INOCULATION AGAINST INFLUENZA: ICD-10-CM

## 2020-02-04 DIAGNOSIS — Z00.00 WELL ADULT HEALTH CHECK: Primary | ICD-10-CM

## 2020-02-04 DIAGNOSIS — I10 ESSENTIAL HYPERTENSION WITH GOAL BLOOD PRESSURE LESS THAN 140/90: ICD-10-CM

## 2020-02-04 DIAGNOSIS — Z12.11 SCREEN FOR COLON CANCER: ICD-10-CM

## 2020-02-04 DIAGNOSIS — Z11.4 ENCOUNTER FOR SCREENING FOR HIV: ICD-10-CM

## 2020-02-04 LAB
CREAT UR-MCNC: 53 MG/DL
MICROALBUMIN UR-MCNC: <5 MG/L
MICROALBUMIN/CREAT UR: NORMAL MG/G CR (ref 0–25)

## 2020-02-04 PROCEDURE — 90682 RIV4 VACC RECOMBINANT DNA IM: CPT | Performed by: NURSE PRACTITIONER

## 2020-02-04 PROCEDURE — 99396 PREV VISIT EST AGE 40-64: CPT | Mod: 25 | Performed by: NURSE PRACTITIONER

## 2020-02-04 PROCEDURE — 80061 LIPID PANEL: CPT | Performed by: NURSE PRACTITIONER

## 2020-02-04 PROCEDURE — 36415 COLL VENOUS BLD VENIPUNCTURE: CPT | Performed by: NURSE PRACTITIONER

## 2020-02-04 PROCEDURE — 87389 HIV-1 AG W/HIV-1&-2 AB AG IA: CPT | Performed by: NURSE PRACTITIONER

## 2020-02-04 PROCEDURE — 80053 COMPREHEN METABOLIC PANEL: CPT | Performed by: NURSE PRACTITIONER

## 2020-02-04 PROCEDURE — 84443 ASSAY THYROID STIM HORMONE: CPT | Performed by: NURSE PRACTITIONER

## 2020-02-04 PROCEDURE — 82043 UR ALBUMIN QUANTITATIVE: CPT | Performed by: NURSE PRACTITIONER

## 2020-02-04 PROCEDURE — 99213 OFFICE O/P EST LOW 20 MIN: CPT | Mod: 25 | Performed by: NURSE PRACTITIONER

## 2020-02-04 PROCEDURE — 90471 IMMUNIZATION ADMIN: CPT | Performed by: NURSE PRACTITIONER

## 2020-02-04 RX ORDER — LISINOPRIL 10 MG/1
TABLET ORAL
Qty: 90 TABLET | Refills: 3 | Status: SHIPPED | OUTPATIENT
Start: 2020-02-04 | End: 2021-03-19

## 2020-02-04 RX ORDER — LEVOTHYROXINE SODIUM 112 MCG
TABLET ORAL
Qty: 90 TABLET | Refills: 3 | Status: SHIPPED | OUTPATIENT
Start: 2020-02-04 | End: 2021-02-24

## 2020-02-04 ASSESSMENT — ENCOUNTER SYMPTOMS
FEVER: 0
HEMATOCHEZIA: 0
CHILLS: 0
DIZZINESS: 0
ABDOMINAL PAIN: 0
NERVOUS/ANXIOUS: 0
COUGH: 0
DIARRHEA: 0
EYE PAIN: 0
CONSTIPATION: 0
FREQUENCY: 0
HEMATURIA: 0

## 2020-02-04 ASSESSMENT — MIFFLIN-ST. JEOR: SCORE: 1365.69

## 2020-02-04 NOTE — PROGRESS NOTES
SUBJECTIVE:   CC: Janine Beverly is an 49 year old woman who presents for preventive health visit.     Healthy Habits:     Getting at least 3 servings of Calcium per day:  NO    Bi-annual eye exam:  Yes    Dental care twice a year:  Yes    Sleep apnea or symptoms of sleep apnea:  None    Diet:  Regular (no restrictions)    Frequency of exercise:  4-5 days/week    Duration of exercise:  45-60 minutes    Taking medications regularly:  Yes    Medication side effects:  None    PHQ-2 Total Score: 0    Additional concerns today:  Yes      Pain in left leg X2 weeks   Thinks it may be related to working out  Felt better after she skipped her workout x 2 days  No swelling/bleeding or bruising  Feels like a pulled muscle    htn refill  Feeling well  Not having any side effects  Due for refills  Wants to stay on this medication/dose    Thyroid refill  Due for labs  Feeling well  Has not missed any doses.    Wants to stay on the same dose.      Today's PHQ-2 Score:   PHQ-2 ( 1999 Pfizer) 2/4/2020   Q1: Little interest or pleasure in doing things 0   Q2: Feeling down, depressed or hopeless 0   PHQ-2 Score 0   Q1: Little interest or pleasure in doing things Not at all   Q2: Feeling down, depressed or hopeless Not at all   PHQ-2 Score 0       Abuse: Current or Past(Physical, Sexual or Emotional)- No  Do you feel safe in your environment? Yes        Social History     Tobacco Use     Smoking status: Never Smoker     Smokeless tobacco: Never Used   Substance Use Topics     Alcohol use: Yes         Alcohol Use 2/4/2020   Prescreen: >3 drinks/day or >7 drinks/week? No     Reviewed orders with patient.  Reviewed health maintenance and updated orders accordingly - Yes    Pertinent mammograms are reviewed under the imaging tab.  History of abnormal Pap smear: NO - age 30-65 PAP every 5 years with negative HPV co-testing recommended     Reviewed and updated as needed this visit by clinical staff  Tobacco  Allergies  Meds  Problems  " Med Hx  Surg Hx  Fam Hx         Reviewed and updated as needed this visit by Provider  Tobacco  Allergies  Meds  Problems  Med Hx  Surg Hx  Fam Hx          Review of Systems   Constitutional: Negative for chills and fever.   HENT: Negative for congestion and ear pain.    Eyes: Negative for pain.   Respiratory: Negative for cough.    Cardiovascular: Negative for chest pain.   Gastrointestinal: Negative for abdominal pain, constipation, diarrhea and hematochezia.   Genitourinary: Negative for frequency and hematuria.   Neurological: Negative for dizziness.   Psychiatric/Behavioral: The patient is not nervous/anxious.         OBJECTIVE:   /81   Pulse (!) 49   Temp 98.2  F (36.8  C) (Oral)   Resp 16   Ht 1.6 m (5' 3\")   Wt 77.7 kg (171 lb 3.2 oz)   SpO2 100%   BMI 30.33 kg/m    Physical Exam  GENERAL: healthy, alert and no distress  EYES: Eyes grossly normal to inspection, PERRL and conjunctivae and sclerae normal  HENT: ear canals and TM's normal, nose and mouth without ulcers or lesions  NECK: no adenopathy, no asymmetry, masses, or scars and thyroid normal to palpation  RESP: lungs clear to auscultation - no rales, rhonchi or wheezes  BREAST: normal without masses, tenderness or nipple discharge and no palpable axillary masses or adenopathy  CV: regular rate and rhythm, normal S1 S2, no S3 or S4, no murmur, click or rub, no peripheral edema and peripheral pulses strong  ABDOMEN: soft, nontender, no hepatosplenomegaly, no masses and bowel sounds normal  MS: no gross musculoskeletal defects noted, no edema  SKIN: no suspicious lesions or rashes  NEURO: Normal strength and tone, mentation intact and speech normal  PSYCH: mentation appears normal, affect normal/bright    Results for orders placed or performed in visit on 02/04/20   HIV Screening     Status: None   Result Value Ref Range    HIV Antigen Antibody Combo Nonreactive NR^Nonreactive       TSH with free T4 reflex     Status: None "   Result Value Ref Range    TSH 1.61 0.40 - 4.00 mU/L   Lipid panel reflex to direct LDL Fasting     Status: Abnormal   Result Value Ref Range    Cholesterol 233 (H) <200 mg/dL    Triglycerides 70 <150 mg/dL    HDL Cholesterol 75 >49 mg/dL    LDL Cholesterol Calculated 144 (H) <100 mg/dL    Non HDL Cholesterol 158 (H) <130 mg/dL   Comprehensive metabolic panel     Status: None   Result Value Ref Range    Sodium 137 133 - 144 mmol/L    Potassium 4.0 3.4 - 5.3 mmol/L    Chloride 107 94 - 109 mmol/L    Carbon Dioxide 25 20 - 32 mmol/L    Anion Gap 5 3 - 14 mmol/L    Glucose 87 70 - 99 mg/dL    Urea Nitrogen 13 7 - 30 mg/dL    Creatinine 0.72 0.52 - 1.04 mg/dL    GFR Estimate >90 >60 mL/min/[1.73_m2]    GFR Estimate If Black >90 >60 mL/min/[1.73_m2]    Calcium 8.5 8.5 - 10.1 mg/dL    Bilirubin Total 0.4 0.2 - 1.3 mg/dL    Albumin 3.7 3.4 - 5.0 g/dL    Protein Total 6.9 6.8 - 8.8 g/dL    Alkaline Phosphatase 49 40 - 150 U/L    ALT 18 0 - 50 U/L    AST 16 0 - 45 U/L   Albumin Random Urine Quantitative with Creat Ratio     Status: None   Result Value Ref Range    Creatinine Urine 53 mg/dL    Albumin Urine mg/L <5 mg/L    Albumin Urine mg/g Cr Unable to calculate due to low value 0 - 25 mg/g Cr       ASSESSMENT/PLAN:       ICD-10-CM    1. Well adult health check Z00.00    2. Hypothyroidism, unspecified type E03.9 SYNTHROID 112 MCG tablet     TSH with free T4 reflex   3. Screen for colon cancer Z12.11    4. Need for prophylactic vaccination and inoculation against influenza Z23 INFLUENZA QUAD, RECOMBINANT, P-FREE (RIV4) (FLUBLOCK) [44273]     Vaccine Administration, Initial [19521]   5. Essential hypertension with goal blood pressure less than 140/90 I10 Lipid panel reflex to direct LDL Fasting     Comprehensive metabolic panel     Albumin Random Urine Quantitative with Creat Ratio     lisinopril (PRINIVIL/ZESTRIL) 10 MG tablet   6. Encounter for screening for HIV Z11.4 HIV Screening      well female, not fasting for labs.   "Encouraged to continue exercise and healthy diet choices.      TSH at goal.    Refill at current dose.    F/u in 1 year or sooner if she should feel symptomatic.      BP at goal  Labs stable  Refill at current dose and f/u in 1 year or sooner if BP's are elevated.        COUNSELING:  Reviewed preventive health counseling, as reflected in patient instructions    Estimated body mass index is 28.51 kg/m  as calculated from the following:    Height as of 1/4/19: 1.607 m (5' 3.25\").    Weight as of 1/4/19: 73.6 kg (162 lb 3.2 oz).         reports that she has never smoked. She has never used smokeless tobacco.    Counseling Resources:  ATP IV Guidelines  Pooled Cohorts Equation Calculator  Breast Cancer Risk Calculator  FRAX Risk Assessment  ICSI Preventive Guidelines  Dietary Guidelines for Americans, 2010  USDA's MyPlate  ASA Prophylaxis  Lung CA Screening    NADIA Reyes CNP  [facility]  "

## 2020-02-04 NOTE — LETTER
February 13, 2020      Janine Beverly  [address] [place] [address]        Dear ,    We are writing to inform you of your test results.    Your LDL cholesterol and glucose are normal.  Thyroid, metabolic panel and HIV tests are normal.         Please let me know if you have any questions.     Resulted Orders   HIV Screening   Result Value Ref Range    HIV Antigen Antibody Combo Nonreactive NR^Nonreactive          Comment:      HIV-1 p24 Ag & HIV-1/HIV-2 Ab Not Detected   TSH with free T4 reflex   Result Value Ref Range    TSH 1.61 0.40 - 4.00 mU/L   Lipid panel reflex to direct LDL Fasting   Result Value Ref Range    Cholesterol 233 (H) <200 mg/dL      Comment:      Desirable:       <200 mg/dl    Triglycerides 70 <150 mg/dL      Comment:      Fasting specimen    HDL Cholesterol 75 >49 mg/dL    LDL Cholesterol Calculated 144 (H) <100 mg/dL      Comment:      Above desirable:  100-129 mg/dl  Borderline High:  130-159 mg/dL  High:             160-189 mg/dL  Very high:       >189 mg/dl      Non HDL Cholesterol 158 (H) <130 mg/dL      Comment:      Above Desirable:  130-159 mg/dl  Borderline high:  160-189 mg/dl  High:             190-219 mg/dl  Very high:       >219 mg/dl     Comprehensive metabolic panel   Result Value Ref Range    Sodium 137 133 - 144 mmol/L    Potassium 4.0 3.4 - 5.3 mmol/L    Chloride 107 94 - 109 mmol/L    Carbon Dioxide 25 20 - 32 mmol/L    Anion Gap 5 3 - 14 mmol/L    Glucose 87 70 - 99 mg/dL      Comment:      Fasting specimen    Urea Nitrogen 13 7 - 30 mg/dL    Creatinine 0.72 0.52 - 1.04 mg/dL    GFR Estimate >90 >60 mL/min/[1.73_m2]      Comment:      Non  GFR Calc  Starting 12/18/2018, serum creatinine based estimated GFR (eGFR) will be   calculated using the Chronic Kidney Disease Epidemiology Collaboration   (CKD-EPI) equation.      GFR Estimate If Black >90 >60 mL/min/[1.73_m2]      Comment:       GFR Calc  Starting 12/18/2018,  serum creatinine based estimated GFR (eGFR) will be   calculated using the Chronic Kidney Disease Epidemiology Collaboration   (CKD-EPI) equation.      Calcium 8.5 8.5 - 10.1 mg/dL    Bilirubin Total 0.4 0.2 - 1.3 mg/dL    Albumin 3.7 3.4 - 5.0 g/dL    Protein Total 6.9 6.8 - 8.8 g/dL    Alkaline Phosphatase 49 40 - 150 U/L    ALT 18 0 - 50 U/L    AST 16 0 - 45 U/L   Albumin Random Urine Quantitative with Creat Ratio   Result Value Ref Range    Creatinine Urine 53 mg/dL    Albumin Urine mg/L <5 mg/L    Albumin Urine mg/g Cr Unable to calculate due to low value 0 - 25 mg/g Cr     If you have any questions or concerns, please call the clinic at the number listed above.   Sincerely,  Natasha Ellis, APRN CNP/nr

## 2020-02-05 LAB
ALBUMIN SERPL-MCNC: 3.7 G/DL (ref 3.4–5)
ALP SERPL-CCNC: 49 U/L (ref 40–150)
ALT SERPL W P-5'-P-CCNC: 18 U/L (ref 0–50)
ANION GAP SERPL CALCULATED.3IONS-SCNC: 5 MMOL/L (ref 3–14)
AST SERPL W P-5'-P-CCNC: 16 U/L (ref 0–45)
BILIRUB SERPL-MCNC: 0.4 MG/DL (ref 0.2–1.3)
BUN SERPL-MCNC: 13 MG/DL (ref 7–30)
CALCIUM SERPL-MCNC: 8.5 MG/DL (ref 8.5–10.1)
CHLORIDE SERPL-SCNC: 107 MMOL/L (ref 94–109)
CHOLEST SERPL-MCNC: 233 MG/DL
CO2 SERPL-SCNC: 25 MMOL/L (ref 20–32)
CREAT SERPL-MCNC: 0.72 MG/DL (ref 0.52–1.04)
GFR SERPL CREATININE-BSD FRML MDRD: >90 ML/MIN/{1.73_M2}
GLUCOSE SERPL-MCNC: 87 MG/DL (ref 70–99)
HDLC SERPL-MCNC: 75 MG/DL
HIV 1+2 AB+HIV1 P24 AG SERPL QL IA: NONREACTIVE
LDLC SERPL CALC-MCNC: 144 MG/DL
NONHDLC SERPL-MCNC: 158 MG/DL
POTASSIUM SERPL-SCNC: 4 MMOL/L (ref 3.4–5.3)
PROT SERPL-MCNC: 6.9 G/DL (ref 6.8–8.8)
SODIUM SERPL-SCNC: 137 MMOL/L (ref 133–144)
TRIGL SERPL-MCNC: 70 MG/DL
TSH SERPL DL<=0.005 MIU/L-ACNC: 1.61 MU/L (ref 0.4–4)

## 2020-02-20 ENCOUNTER — TELEPHONE (OUTPATIENT)
Dept: FAMILY MEDICINE | Facility: CLINIC | Age: 50
End: 2020-02-20

## 2020-02-20 NOTE — TELEPHONE ENCOUNTER
Patient called stating she was seen on 2/4/20 and Cologuard was ordered however, pt states she received a letter saying they need additional info.      Patient was told they have contacted us and waiting for a reply as they are unable to send the cologuard to pt     Please update patient on status

## 2020-02-25 NOTE — TELEPHONE ENCOUNTER
LM informing patient that writer spoke with [employer] & they just needed the ICD-10 code.     They'll ship the cologuard out to patient within 5-7 business days.    Erendira ABBASI     [facility]

## 2020-03-04 ENCOUNTER — TRANSFERRED RECORDS (OUTPATIENT)
Dept: HEALTH INFORMATION MANAGEMENT | Facility: CLINIC | Age: 50
End: 2020-03-04

## 2020-03-04 LAB — COLOGUARD-ABSTRACT: NEGATIVE

## 2020-03-16 ENCOUNTER — ANCILLARY PROCEDURE (OUTPATIENT)
Dept: MAMMOGRAPHY | Facility: CLINIC | Age: 50
End: 2020-03-16
Attending: NURSE PRACTITIONER
Payer: COMMERCIAL

## 2020-03-16 DIAGNOSIS — Z12.31 VISIT FOR SCREENING MAMMOGRAM: ICD-10-CM

## 2020-06-01 ENCOUNTER — NURSE TRIAGE (OUTPATIENT)
Dept: NURSING | Facility: CLINIC | Age: 50
End: 2020-06-01

## 2020-06-01 NOTE — TELEPHONE ENCOUNTER
Patient asking if PCP can send her prescription for Synthroid to [facility] in [place] as pharmacy that has prescription is closed and she was directed to another [facility] which is also closed.  Patient then stated she had an incoming call from [facility] and will speak with them.

## 2020-06-19 ENCOUNTER — VIRTUAL VISIT (OUTPATIENT)
Dept: FAMILY MEDICINE | Facility: CLINIC | Age: 50
End: 2020-06-19
Payer: COMMERCIAL

## 2020-06-19 DIAGNOSIS — H60.391 INFECTIVE OTITIS EXTERNA, RIGHT: Primary | ICD-10-CM

## 2020-06-19 PROCEDURE — 99213 OFFICE O/P EST LOW 20 MIN: CPT | Mod: GT | Performed by: PHYSICIAN ASSISTANT

## 2020-06-19 RX ORDER — NEOMYCIN SULFATE, POLYMYXIN B SULFATE AND HYDROCORTISONE 10; 3.5; 1 MG/ML; MG/ML; [USP'U]/ML
4 SUSPENSION/ DROPS AURICULAR (OTIC) 4 TIMES DAILY
Qty: 6 ML | Refills: 0 | Status: SHIPPED | OUTPATIENT
Start: 2020-06-19 | End: 2020-06-26

## 2020-06-19 NOTE — PROGRESS NOTES
"Janine Beverly is a 49 year old female who is being evaluated via a billable video visit.      The patient has been notified of following:     \"This video visit will be conducted via a call between you and your physician/provider. We have found that certain health care needs can be provided without the need for an in-person physical exam.  This service lets us provide the care you need with a video conversation.  If a prescription is necessary we can send it directly to your pharmacy.  If lab work is needed we can place an order for that and you can then stop by our lab to have the test done at a later time.    Video visits are billed at different rates depending on your insurance coverage.  Please reach out to your insurance provider with any questions.    If during the course of the call the physician/provider feels a video visit is not appropriate, you will not be charged for this service.\"    Patient has given verbal consent for Video visit? Yes    Will anyone else be joining your video visit? No    Subjective     Janine Beverly is a 49 year old female who presents today via video visit for the following health issues:    HPI  Ear pain       Duration: Monday June 15th    Description (location/character/radiation): Right ear, itching, pain     Intensity:  mild    Accompanying signs and symptoms: No    History (similar episodes/previous evaluation): Yes- as a child had swimmer's ear    Precipitating or alleviating factors: was swimming this past weekend     Therapies tried and outcome: Ceasar's earache drops     No history of cerumen impaction.   No sinus congestion or flare of seasonal allergies.          Video Start Time: 7:23 AM        Review of Systems   Constitutional, HEENT, cardiovascular, pulmonary, gi and gu systems are negative, except as otherwise noted.      Objective    There were no vitals taken for this visit.  Estimated body mass index is 30.33 kg/m  as calculated from the following:    Height as " "of 2/4/20: 1.6 m (5' 3\").    Weight as of 2/4/20: 77.7 kg (171 lb 3.2 oz).  Physical Exam     GENERAL: Healthy, alert and no distress  EYES: Eyes grossly normal to inspection.  No discharge or erythema, or obvious scleral/conjunctival abnormalities.  RESP: No audible wheeze, cough, or visible cyanosis.  No visible retractions or increased work of breathing.    SKIN: Visible skin clear. No significant rash, abnormal pigmentation or lesions.  NEURO: Cranial nerves grossly intact.  Mentation and speech appropriate for age.  PSYCH: Mentation appears normal, affect normal/bright, judgement and insight intact, normal speech and appearance well-groomed.      Diagnostic Test Results:  none         Assessment & Plan     (H60.391) Infective otitis externa, right  (primary encounter diagnosis)  Comment: Suspect swimmer's ear. Cortisporin otic QID x 7 days. If not improving return to care for further evaluation.   Plan: neomycin-polymyxin-hydrocortisone (CORTISPORIN)        3.5-12201-3 otic suspension                 Patient Instructions     Patient Education     External Ear Infection (Adult)    External otitis (also called  swimmer s ear ) is an infection in the ear canal. It is often caused by bacteria or fungus. It can occur a few days after water gets trapped in the ear canal (from swimming or bathing). It can also occur after cleaning too deeply in the ear canal with a cotton swab or other object. Sometimes, hair care products get into the ear canal and cause this problem.  Symptoms can include pain, fever, itching, redness, drainage, or swelling of the ear canal. Temporary hearing loss may also occur.  Home care    Do not try to clean the ear canal. This can push pus and bacteria deeper into the canal.    Use prescribed ear drops as directed. These help reduce swelling and fight the infection. If an ear wick was placed in the ear canal, apply drops right onto the end of the wick. The wick will draw the medicine into the " ear canal even if it is swollen closed.    A cotton ball may be loosely placed in the outer ear to absorb any drainage.    You may use acetaminophen or ibuprofen to control pain, unless another medicine was prescribed. Note: If you have chronic liver or kidney disease or ever had a stomach ulcer or GI bleeding, talk to your healthcare provider before taking any of these medicines.    Do not allow water to get into your ear when bathing. Also, don't swim until the infection has cleared.  Prevention    Keep your ears dry. This helps lower the risk of infection. Dry your ears with a towel or hair dryer after getting wet. Also, use ear plugs when swimming.    Do not stick any objects in the ear to remove wax.    If you feel water trapped in your ear, use ear drops right away. You can get these drops over the counter at most drugstores. They work by removing water from the ear canal.  Follow-up care  Follow up with your healthcare provider in 1 week, or as advised.  When to seek medical advice  Call your healthcare provider right away if any of these occur:    Ear pain becomes worse or doesn t improve after 3 days of treatment    Redness or swelling of the outer ear occurs or gets worse    Headache    Painful or stiff neck    Drowsiness or confusion    Fever of 100.4 F (38 C) or higher, or as directed by your healthcare provider    Seizure  Date Last Reviewed: 10/1/2017    2495-7179 The [employer]. [address] [place], [place] [address]. All rights reserved. This information is not intended as a substitute for professional medical care. Always follow your healthcare professional's instructions.               Return for if not improving or worsening.    Charles Yost PA-C  [facility]      Video-Visit Details    Type of service:  Video Visit    Video End Time:7:30 AM    Originating Location (pt. Location): Home    Distant Location (provider location):  Home office     Platform used for  Video Visit: [employer]    Return for if not improving or worsening.       Charles Yost PA-C

## 2020-06-19 NOTE — PATIENT INSTRUCTIONS
Patient Education     External Ear Infection (Adult)    External otitis (also called  swimmer s ear ) is an infection in the ear canal. It is often caused by bacteria or fungus. It can occur a few days after water gets trapped in the ear canal (from swimming or bathing). It can also occur after cleaning too deeply in the ear canal with a cotton swab or other object. Sometimes, hair care products get into the ear canal and cause this problem.  Symptoms can include pain, fever, itching, redness, drainage, or swelling of the ear canal. Temporary hearing loss may also occur.  Home care    Do not try to clean the ear canal. This can push pus and bacteria deeper into the canal.    Use prescribed ear drops as directed. These help reduce swelling and fight the infection. If an ear wick was placed in the ear canal, apply drops right onto the end of the wick. The wick will draw the medicine into the ear canal even if it is swollen closed.    A cotton ball may be loosely placed in the outer ear to absorb any drainage.    You may use acetaminophen or ibuprofen to control pain, unless another medicine was prescribed. Note: If you have chronic liver or kidney disease or ever had a stomach ulcer or GI bleeding, talk to your healthcare provider before taking any of these medicines.    Do not allow water to get into your ear when bathing. Also, don't swim until the infection has cleared.  Prevention    Keep your ears dry. This helps lower the risk of infection. Dry your ears with a towel or hair dryer after getting wet. Also, use ear plugs when swimming.    Do not stick any objects in the ear to remove wax.    If you feel water trapped in your ear, use ear drops right away. You can get these drops over the counter at most drugstores. They work by removing water from the ear canal.  Follow-up care  Follow up with your healthcare provider in 1 week, or as advised.  When to seek medical advice  Call your healthcare provider right  away if any of these occur:    Ear pain becomes worse or doesn t improve after 3 days of treatment    Redness or swelling of the outer ear occurs or gets worse    Headache    Painful or stiff neck    Drowsiness or confusion    Fever of 100.4 F (38 C) or higher, or as directed by your healthcare provider    Seizure  Date Last Reviewed: 10/1/2017    2916-3813 [employer]. [address] [place] [address]. All rights reserved. This information is not intended as a substitute for professional medical care. Always follow your healthcare professional's instructions.

## 2020-08-31 ENCOUNTER — VIRTUAL VISIT (OUTPATIENT)
Dept: FAMILY MEDICINE | Facility: OTHER | Age: 50
End: 2020-08-31
Payer: COMMERCIAL

## 2020-08-31 PROCEDURE — 99421 OL DIG E/M SVC 5-10 MIN: CPT | Performed by: PHYSICIAN ASSISTANT

## 2020-09-01 DIAGNOSIS — Z20.822 SUSPECTED COVID-19 VIRUS INFECTION: Primary | ICD-10-CM

## 2020-09-02 LAB
SARS-COV-2 RNA SPEC QL NAA+PROBE: NOT DETECTED
SPECIMEN SOURCE: NORMAL

## 2020-09-02 NOTE — PROGRESS NOTES
"Date: 2020 18:47:37  Clinician: Genevieve Merino  Clinician NPI: [id number]  Patient: Janine Beverly  Patient : 1970  Patient Address: [address], [place] [address]  Patient Phone: [phone]  Visit Protocol: URI  Patient Summary:  Janine is a 50 year old ( : 1970 ) female who initiated a Visit for COVID-19 (Coronavirus) evaluation and screening. When asked the question \"Please sign me up to receive news, health information and promotions from [employer].\", Janine responded \"No\".    Janine states her symptoms started gradually 3-4 days ago.   Her symptoms consist of a sore throat and a cough.   Symptom details     Cough: Janine coughs a few times an hour and her cough is not more bothersome at night. Phlegm does not come into her throat when she coughs. She does not believe her cough is caused by post-nasal drip.     Sore throat: Janine reports having moderate throat pain (4-6 on a 10 point pain scale), does not have exudate on her tonsils, and can swallow liquids. The lymph nodes in her neck are not enlarged. A rash has not appeared on the skin since the sore throat started.      Janine denies having ear pain, headache, chills, malaise, enlarged lymph nodes, fever, wheezing, teeth pain, ageusia, diarrhea, nasal congestion, vomiting, rhinitis, nausea, myalgias, anosmia, and facial pain or pressure. She also denies having recent facial or sinus surgery in the past 60 days, taking antibiotic medication in the past month, and double sickening (worsening symptoms after initial improvement). She is not experiencing dyspnea.   Precipitating events  Within the past week, Janine has not been exposed to someone with strep throat. She has not recently been exposed to someone with influenza. Janine has not been in close contact with any high risk individuals.   Pertinent COVID-19 (Coronavirus) information  In the past 14 days, Janine has not worked in a congregate living setting.   She does not work or volunteer as " healthcare worker or a  and does not work or volunteer in a healthcare facility.   Janine also has not lived in a congregate living setting in the past 14 days. She does not live with a healthcare worker.   Janine has had a close contact with a laboratory-confirmed COVID-19 patient within 14 days of symptom onset.   Since December 2019, Janine and has not had upper respiratory infection or influenza-like illness. Has not been diagnosed with lab-confirmed COVID-19 test   Pertinent medical history  Janine does not get yeast infections when she takes antibiotics.   Janine does not need a return to work/school note.   Weight: 170 lbs   Janine does not smoke or use smokeless tobacco.   Weight: 170 lbs    MEDICATIONS: lisinopril oral, Synthroid oral, ALLERGIES: penicillin V  Clinician Response:  Dear Janine,   Your symptoms show that you may have coronavirus (COVID-19). This illness can cause fever, cough and trouble breathing. Many people get a mild case and get better on their own. Some people can get very sick.  What should I do?  We would like to test you for this virus.   1. Please call [phone] to schedule your visit. Explain that you were referred by [facility] to have a COVID-19 test. Be ready to share your [facility] visit ID number.  The following will serve as your written order for this COVID Test, ordered by me, for the indication of suspected COVID [Z20.828]: The test will be ordered in [employer], our electronic health record, after you are scheduled. It will show as ordered and authorized by Tye Riley MD.  Order: COVID-19 (Coronavirus) PCR for SYMPTOMATIC testing from [facility].      2. When it's time for your COVID test:  Stay at least 6 feet away from others. (If someone will drive you to your test, stay in the backseat, as far away from the  as you can.)   Cover your mouth and nose with a mask, tissue or washcloth.  Go straight to the testing site. Don't make any stops on the way there or back.      3.Starting  "now: Stay home and away from others (self-isolate) until:   You've had no fever---and no medicine that reduces fever---for one full day (24 hours). And...   Your other symptoms have gotten better. For example, your cough or breathing has improved. And...   At least 10 days have passed since your symptoms started.       During this time, don't leave the house except for testing or medical care.   Stay in your own room, even for meals. Use your own bathroom if you can.   Stay away from others in your home. No hugging, kissing or shaking hands. No visitors.  Don't go to work, school or anywhere else.    Clean \"high touch\" surfaces often (doorknobs, counters, handles, etc.). Use a household cleaning spray or wipes. You'll find a full list of  on the EPA website: www.epa.gov/pesticide-registration/list-n-disinfectants-use-against-sars-cov-2.   Cover your mouth and nose with a mask, tissue or washcloth to avoid spreading germs.  Wash your hands and face often. Use soap and water.  Caregivers in these groups are at risk for severe illness due to COVID-19:  o People 65 years and older  o People who live in a nursing home or long-term care facility  o People with chronic disease (lung, heart, cancer, diabetes, kidney, liver, immunologic)  o People who have a weakened immune system, including those who:   Are in cancer treatment  Take medicine that weakens the immune system, such as corticosteroids  Had a bone marrow or organ transplant  Have an immune deficiency  Have poorly controlled HIV or AIDS  Are obese (body mass index of 40 or higher)  Smoke regularly   o Caregivers should wear gloves while washing dishes, handling laundry and cleaning bedrooms and bathrooms.  o Use caution when washing and drying laundry: Don't shake dirty laundry, and use the warmest water setting that you can.  o For more tips, go to www.cdc.gov/coronavirus/2019-ncov/downloads/10Things.pdf.    4.Sign up for [facility]. We know it's scary " to hear that you might have COVID-19. We want to track your symptoms to make sure you're okay over the next 2 weeks. Please look for an email from [employer]---this is a free, online program that we'll use to keep in touch. To sign up, follow the link in the email. Learn more at [email or web address]  How can I take care of myself?   Get lots of rest. Drink extra fluids (unless a doctor has told you not to).   Take Tylenol (acetaminophen) for fever or pain. If you have liver or kidney problems, ask your family doctor if it's okay to take Tylenol.   Adults can take either:    650 mg (two 325 mg pills) every 4 to 6 hours, or...   1,000 mg (two 500 mg pills) every 8 hours as needed.    Note: Don't take more than 3,000 mg in one day. Acetaminophen is found in many medicines (both prescribed and over-the-counter medicines). Read all labels to be sure you don't take too much.   For children, check the Tylenol bottle for the right dose. The dose is based on the child's age or weight.    If you have other health problems (like cancer, heart failure, an organ transplant or severe kidney disease): Call your specialty clinic if you don't feel better in the next 2 days.       Know when to call 911. Emergency warning signs include:    Trouble breathing or shortness of breath Pain or pressure in the chest that doesn't go away Feeling confused like you haven't felt before, or not being able to wake up Bluish-colored lips or face.  Where can I get more information?    [facility] -- About COVID-19: [email or web address]   CDC -- What to Do If You're Sick: www.cdc.gov/coronavirus/2019-ncov/about/steps-when-sick.html   CDC -- Ending Home Isolation: www.cdc.gov/coronavirus/2019-ncov/hcp/disposition-in-home-patients.html   CDC -- Caring for Someone: www.cdc.gov/coronavirus/2019-ncov/if-you-are-sick/care-for-someone.html   [facility] -- Interim Guidance for Hospital Discharge to Home:  www.health.[facility].[place]/diseases/coronavirus/hcp/hospdischarge.pdf   [facility] clinical trials (COVID-19 research studies): [email or web address]/[email or web address]-trials    Below are the COVID-19 hotlines at the [facility] ([facility]). Interpreters are available.    For health questions: Call [phone] or 1-914.306.7787 (7 a.m. to 7 p.m.) For questions about schools and childcare: Call 344-130-8193 or 1-797.333.9414 (7 a.m. to 7 p.m.)    Diagnosis: Cough  Diagnosis ICD: R05

## 2020-11-29 ENCOUNTER — HEALTH MAINTENANCE LETTER (OUTPATIENT)
Age: 50
End: 2020-11-29

## 2021-02-22 DIAGNOSIS — E03.9 HYPOTHYROIDISM, UNSPECIFIED TYPE: ICD-10-CM

## 2021-02-24 RX ORDER — LEVOTHYROXINE SODIUM 112 MCG
TABLET ORAL
Qty: 30 TABLET | Refills: 0 | Status: SHIPPED | OUTPATIENT
Start: 2021-02-24 | End: 2021-03-19

## 2021-02-24 NOTE — TELEPHONE ENCOUNTER
"Requested Prescriptions   Pending Prescriptions Disp Refills     SYNTHROID 112 MCG tablet [Pharmacy Med Name: SYNTHROID 112 MCG TABLET] 90 tablet 1     Sig: TAKE 1 TABLET BY MOUTH EVERY DAY       Thyroid Protocol Failed - 2/22/2021 12:30 AM        Failed - Normal TSH on file in past 12 months     Recent Labs   Lab Test 02/04/20  0944   TSH 1.61              Passed - Patient is 12 years or older        Passed - Recent (12 mo) or future (30 days) visit within the authorizing provider's specialty     Patient has had an office visit with the authorizing provider or a provider within the authorizing providers department within the previous 12 mos or has a future within next 30 days. See \"Patient Info\" tab in inbasket, or \"Choose Columns\" in Meds & Orders section of the refill encounter.              Passed - Medication is active on med list        Passed - No active pregnancy on record     If patient is pregnant or has had a positive pregnancy test, please check TSH.          Passed - No positive pregnancy test in past 12 months     If patient is pregnant or has had a positive pregnancy test, please check TSH.             Patient has 6 tablets left    Routing refill request to provider for review/approval because:  Due for labs, due for annual office visit         "

## 2021-03-19 ENCOUNTER — VIRTUAL VISIT (OUTPATIENT)
Dept: FAMILY MEDICINE | Facility: CLINIC | Age: 51
End: 2021-03-19
Payer: COMMERCIAL

## 2021-03-19 DIAGNOSIS — E03.9 HYPOTHYROIDISM, UNSPECIFIED TYPE: ICD-10-CM

## 2021-03-19 DIAGNOSIS — I10 ESSENTIAL HYPERTENSION WITH GOAL BLOOD PRESSURE LESS THAN 140/90: ICD-10-CM

## 2021-03-19 PROCEDURE — 99214 OFFICE O/P EST MOD 30 MIN: CPT | Mod: 95 | Performed by: NURSE PRACTITIONER

## 2021-03-19 RX ORDER — LEVOTHYROXINE SODIUM 112 MCG
TABLET ORAL
Qty: 90 TABLET | Refills: 3 | Status: SHIPPED | OUTPATIENT
Start: 2021-03-19 | End: 2022-03-24

## 2021-03-19 RX ORDER — LISINOPRIL 10 MG/1
TABLET ORAL
Qty: 90 TABLET | Refills: 3 | Status: SHIPPED | OUTPATIENT
Start: 2021-03-19 | End: 2022-03-24

## 2021-03-19 NOTE — PROGRESS NOTES
Janine is a 50 year old who is being evaluated via a billable video visit.      How would you like to obtain your AVS? MyChart  If the video visit is dropped, the invitation should be resent by: Text to cell phone: [phone]    Will anyone else be joining your video visit? No    Video Start Time: 8:35 AM    Assessment & Plan     Essential hypertension with goal blood pressure less than 140/90  Future labs pending  Will get BP checked in the office when she gets labs  Refilled x 1 year  F/u in 1 year or sooner if labs not at goal.      - lisinopril (ZESTRIL) 10 MG tablet; TAKE 1 TABLET BY MOUTH EVERY DAY  - **Basic metabolic panel FUTURE anytime; Future  - Albumin Random Urine Quantitative with Creat Ratio; Future    Hypothyroidism, unspecified type  Future labs pending  Refilled 1 year, f/u in 1 year or sooner if labs not at goal.      - SYNTHROID 112 MCG tablet; TAKE 1 TABLET BY MOUTH EVERY DAY  - **TSH with free T4 reflex FUTURE anytime; Future         No follow-ups on file.    NADIA Reyes [facility]    Jeana Mehta is a 50 year old who presents for the following health issues     HPI       History of Present Illness       Hypertension: She presents for follow up of hypertension.  She does not check blood pressure  regularly outside of the clinic. Outpatient blood pressures have not been over 140/90. She does not follow a low salt diet.      BP has been stable for years  Due for labs  Not checking BP's regularly  Feeling well  Less exercise than pre-pandemic but still swimming 3 times a week for 30 minutes.    Denies chest pain, VICKERS, SOB, dizziness or lightheadedness.  No pain radiating to left arm or jaw.  No reflux.       Hypothyroidism Follow-up      Since last visit, patient describes the following symptoms: Weight stable, no hair loss, no skin changes, no constipation, no loose stools      How many servings of fruits and vegetables do you eat daily?  2-3    On  "average, how many sweetened beverages do you drink each day (Examples: soda, juice, sweet tea, etc.  Do NOT count diet or artificially sweetened beverages)?   0    How many days per week do you exercise enough to make your heart beat faster? 5    How many minutes a day do you exercise enough to make your heart beat faster? 30 - 60    How many days per week do you miss taking your medication? 0     Feeling well  No side effects  Due for refills and labs      Review of Systems   Constitutional, HEENT, cardiovascular, pulmonary, gi and gu systems are negative, except as otherwise noted.      Objective    Vitals - Patient Reported  Weight (Patient Reported): 74.4 kg (164 lb)  Height (Patient Reported): 161.3 cm (5' 3.5\")  BMI (Based on Pt Reported Ht/Wt): 28.6        Physical Exam   GENERAL: Healthy, alert and no distress  EYES: Eyes grossly normal to inspection.  No discharge or erythema, or obvious scleral/conjunctival abnormalities.  RESP: No audible wheeze, cough, or visible cyanosis.  No visible retractions or increased work of breathing.    SKIN: Visible skin clear. No significant rash, abnormal pigmentation or lesions.  NEURO: Cranial nerves grossly intact.  Mentation and speech appropriate for age.  PSYCH: Mentation appears normal, affect normal/bright, judgement and insight intact, normal speech and appearance well-groomed.            Video-Visit Details    Type of service:  Video Visit    Video End Time:8:42 AM    Originating Location (pt. Location): Home    Distant Location (provider location):  [facility]     Platform used for Video Visit: Dustin    "

## 2021-03-22 DIAGNOSIS — Z12.31 VISIT FOR SCREENING MAMMOGRAM: ICD-10-CM

## 2021-03-22 PROCEDURE — 77063 BREAST TOMOSYNTHESIS BI: CPT | Mod: GC

## 2021-03-22 PROCEDURE — 77067 SCR MAMMO BI INCL CAD: CPT | Mod: GC

## 2021-04-09 ENCOUNTER — IMMUNIZATION (OUTPATIENT)
Dept: NURSING | Facility: CLINIC | Age: 51
End: 2021-04-09
Payer: COMMERCIAL

## 2021-04-09 PROCEDURE — 0001A PR COVID VAC PFIZER DIL RECON 30 MCG/0.3 ML IM: CPT

## 2021-04-09 PROCEDURE — 91300 PR COVID VAC PFIZER DIL RECON 30 MCG/0.3 ML IM: CPT

## 2021-04-10 ENCOUNTER — HEALTH MAINTENANCE LETTER (OUTPATIENT)
Age: 51
End: 2021-04-10

## 2021-04-30 ENCOUNTER — IMMUNIZATION (OUTPATIENT)
Dept: NURSING | Facility: CLINIC | Age: 51
End: 2021-04-30
Attending: FAMILY MEDICINE
Payer: COMMERCIAL

## 2021-04-30 PROCEDURE — 0002A PR COVID VAC PFIZER DIL RECON 30 MCG/0.3 ML IM: CPT

## 2021-04-30 PROCEDURE — 91300 PR COVID VAC PFIZER DIL RECON 30 MCG/0.3 ML IM: CPT

## 2021-06-10 ENCOUNTER — OFFICE VISIT (OUTPATIENT)
Dept: FAMILY MEDICINE | Facility: CLINIC | Age: 51
End: 2021-06-10
Payer: COMMERCIAL

## 2021-06-10 VITALS
HEIGHT: 63 IN | SYSTOLIC BLOOD PRESSURE: 120 MMHG | BODY MASS INDEX: 30.12 KG/M2 | RESPIRATION RATE: 14 BRPM | OXYGEN SATURATION: 100 % | DIASTOLIC BLOOD PRESSURE: 82 MMHG | TEMPERATURE: 98.4 F | WEIGHT: 170 LBS | HEART RATE: 60 BPM

## 2021-06-10 DIAGNOSIS — M54.50 ACUTE RIGHT-SIDED LOW BACK PAIN WITHOUT SCIATICA: ICD-10-CM

## 2021-06-10 DIAGNOSIS — K13.0 LESION OF LIP: Primary | ICD-10-CM

## 2021-06-10 DIAGNOSIS — M79.18 RIGHT BUTTOCK PAIN: ICD-10-CM

## 2021-06-10 PROCEDURE — 99214 OFFICE O/P EST MOD 30 MIN: CPT | Performed by: NURSE PRACTITIONER

## 2021-06-10 ASSESSMENT — MIFFLIN-ST. JEOR: SCORE: 1359.2

## 2021-06-10 NOTE — PROGRESS NOTES
"    Assessment & Plan     Lesion of lip  US pending for changing vascular lesion on lip  - US Head Neck Soft Tissue; Future  -derm referral     Right buttock pain  Refer to [facility] for full eval  - [facility] REFERRAL; Future    Acute right-sided low back pain without sciatica  - [facility] REF[facility]; Future           BMI:   Estimated body mass index is 29.88 kg/m  as calculated from the following:    Height as of this encounter: 1.607 m (5' 3.25\").    Weight as of this encounter: 77.1 kg (170 lb).       No follow-ups on file.    Natasha Ellis, NADIA CNP  [facility]    Jeana Mehta is a 50 year old who presents for the following health issues     HPI       mass      Duration: 2 weeks     Description (location/character/radiation): inner lips    Intensity:  mild    Accompanying signs and symptoms:  Swollen and keith. 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    In the last 2 weeks has gotten bigger and now extending to the ourside of her lip.    Nontender.  But hard to touch.    No specific recent trauma or injury.      Also wants PT referral for right buttock/low back pain.    Ongoing.    No numbness or tingling.    No shooting nerve pains.    Tried stretching, minimal relief.         Review of Systems   Constitutional, HEENT, cardiovascular, pulmonary, gi and gu systems are negative, except as otherwise noted.      Objective    /82 (BP Location: Left arm, Patient Position: Chair, Cuff Size: Adult Regular)   Pulse 60   Temp 98.4  F (36.9  C) (Temporal)   Resp 14   Ht 1.607 m (5' 3.25\")   Wt 77.1 kg (170 lb)   SpO2 100%   BMI 29.88 kg/m    Body mass index is 29.88 kg/m .  Physical Exam   GENERAL: healthy, alert and no distress  HENT: oropharynx with dark purple paplue " extending through lower lip and oral mucous membranes moist  NECK: no adenopathy, no asymmetry, masses, or scars and thyroid normal to palpation  RESP: lungs clear to auscultation - no rales, rhonchi or wheezes  CV: regular rate and rhythm, normal S1 S2, no S3 or S4, no murmur, click or rub, no peripheral edema and peripheral pulses strong  MS: no gross musculoskeletal defects noted, no edema

## 2021-06-15 ENCOUNTER — ANCILLARY PROCEDURE (OUTPATIENT)
Dept: ULTRASOUND IMAGING | Facility: CLINIC | Age: 51
End: 2021-06-15
Attending: NURSE PRACTITIONER
Payer: COMMERCIAL

## 2021-06-15 DIAGNOSIS — K13.0 LESION OF LIP: ICD-10-CM

## 2021-06-15 PROCEDURE — 76536 US EXAM OF HEAD AND NECK: CPT | Mod: GC | Performed by: RADIOLOGY

## 2021-06-23 ENCOUNTER — THERAPY VISIT (OUTPATIENT)
Dept: PHYSICAL THERAPY | Facility: CLINIC | Age: 51
End: 2021-06-23
Attending: NURSE PRACTITIONER
Payer: COMMERCIAL

## 2021-06-23 DIAGNOSIS — M79.18 RIGHT BUTTOCK PAIN: ICD-10-CM

## 2021-06-23 DIAGNOSIS — K13.0 LESION OF LIP: ICD-10-CM

## 2021-06-23 DIAGNOSIS — M54.50 ACUTE RIGHT-SIDED LOW BACK PAIN WITHOUT SCIATICA: ICD-10-CM

## 2021-06-23 DIAGNOSIS — M25.551 HIP PAIN, RIGHT: ICD-10-CM

## 2021-06-23 PROCEDURE — 97161 PT EVAL LOW COMPLEX 20 MIN: CPT | Mod: GP | Performed by: PHYSICAL THERAPIST

## 2021-06-23 PROCEDURE — 97530 THERAPEUTIC ACTIVITIES: CPT | Mod: GP | Performed by: PHYSICAL THERAPIST

## 2021-06-23 PROCEDURE — 97110 THERAPEUTIC EXERCISES: CPT | Mod: GP | Performed by: PHYSICAL THERAPIST

## 2021-06-23 ASSESSMENT — ACTIVITIES OF DAILY LIVING (ADL)
HOS_ADL_COUNT: 17
GETTING_INTO_AND_OUT_OF_A_BATHTUB: NO DIFFICULTY AT ALL
WALKING_DOWN_STEEP_HILLS: NO DIFFICULTY AT ALL
WALKING_APPROXIMATELY_10_MINUTES: SLIGHT DIFFICULTY
HEAVY_WORK: NO DIFFICULTY AT ALL
STEPPING_UP_AND_DOWN_CURBS: NO DIFFICULTY AT ALL
GOING_UP_1_FLIGHT_OF_STAIRS: NO DIFFICULTY AT ALL
RECREATIONAL_ACTIVITIES: SLIGHT DIFFICULTY
TWISTING/PIVOTING_ON_INVOLVED_LEG: NO DIFFICULTY AT ALL
LIGHT_TO_MODERATE_WORK: NO DIFFICULTY AT ALL
PUTTING_ON_SOCKS_AND_SHOES: NO DIFFICULTY AT ALL
WALKING_15_MINUTES_OR_GREATER: SLIGHT DIFFICULTY
WALKING_UP_STEEP_HILLS: NO DIFFICULTY AT ALL
STANDING_FOR_15_MINUTES: NO DIFFICULTY AT ALL
SITTING_FOR_15_MINUTES: NO DIFFICULTY AT ALL
GETTING_INTO_AND_OUT_OF_AN_AVERAGE_CAR: NO DIFFICULTY AT ALL
HOW_WOULD_YOU_RATE_YOUR_CURRENT_LEVEL_OF_FUNCTION_DURING_YOUR_USUAL_ACTIVITIES_OF_DAILY_LIVING_FROM_0_TO_100_WITH_100_BEING_YOUR_LEVEL_OF_FUNCTION_PRIOR_TO_YOUR_HIP_PROBLEM_AND_0_BEING_THE_INABILITY_TO_PERFORM_ANY_OF_YOUR_USUAL_DAILY_ACTIVITIES?: 90
WALKING_INITIALLY: NO DIFFICULTY AT ALL
GOING_DOWN_1_FLIGHT_OF_STAIRS: NO DIFFICULTY AT ALL
HOS_ADL_HIGHEST_POTENTIAL_SCORE: 68
HOS_ADL_SCORE(%): 95.59
DEEP_SQUATTING: NO DIFFICULTY AT ALL
ROLLING_OVER_IN_BED: NO DIFFICULTY AT ALL
HOS_ADL_ITEM_SCORE_TOTAL: 65

## 2021-06-23 NOTE — PROGRESS NOTES
Physical Therapy Initial Evaluation  Subjective:  The history is provided by the patient.   Therapist Generated HPI Evaluation  Problem details: Pt presents today with R sided buttock pain. Pain has been present since this past winter. No notable injury that started symptoms, reports more gradual onset over time. Currently locates pain in R buttock and at times can travel to lateral R hip, points to over greater trochanter. Describes pain as achy. Denies numbness/tingling and significant weakness in RLE. Pain worse with long distance walking, limited to about 1 mile at 5/10 pain. Also painful with long periods of sitting, pain after 30 min, standing for long periods of time. Pain better with topical creams, hot/cold packs, self massage, OTC medication at times. No other treatment sought out. No surgeries noted in lumbar spine or R hip in the past. No imaging taken of hip or low back area. Pt works as an , a lot of computer work and sitting. Pt's goals involve reduce pain so she can walk more and be more active..         Type of problem:  Lumbar.                                                 Objective:  System         Lumbar/SI Evaluation  ROM:  Arom wnl lumbar: 1/10 R buttock pain.  AROM Lumbar:   Flexion:          To floor, no change in sxs  Ext:                    100% ROM, no change in sxs   Side Bend:        Left:  Past knee, no sxs    Right:  Past knee, no sxs  Rotation:           Left:     Right:   Side Glide:        Left:     Right:           Lumbar Myotomes:  normal            Lumbar DTR's:  normal        Lumbar Dermtomes:  normal                Neural Tension/Mobility:      Right side:   Slump positive.                                                         Misael Lumbar Evaluation        Test Movements:        EIL: During: produces  After: no effect  Mechanical Response: IncROM  Repeat EIL: During: produces                                                   ROS    Assessment/Plan:    Patient is  a 50 year old female with right side hip complaints.    Patient has the following significant findings with corresponding treatment plan.                Diagnosis 1:  R buttock pain  Pain -  hot/cold therapy, US, electric stimulation, mechanical traction, manual therapy, splint/taping/bracing/orthotics, self management, education, directional preference exercise and home program  Decreased strength - therapeutic exercise, therapeutic activities and home program  Impaired muscle performance - neuro re-education and home program  Decreased function - therapeutic activities and home program    Therapy Evaluation Codes:   1) History comprised of:   Personal factors that impact the plan of care:      None.    Comorbidity factors that impact the plan of care are:      High blood pressure.     Medications impacting care: High blood pressure.  2) Examination of Body Systems comprised of:   Body structures and functions that impact the plan of care:      Hip and Lumbar spine.   Activity limitations that impact the plan of care are:      Bending, Lifting, Sitting, Standing and Walking.  3) Clinical presentation characteristics are:   Stable/Uncomplicated.  4) Decision-Making    Low complexity using standardized patient assessment instrument and/or measureable assessment of functional outcome.  Cumulative Therapy Evaluation is: Low complexity.    Previous and current functional limitations:  (See Goal Flow Sheet for this information)    Short term and Long term goals: (See Goal Flow Sheet for this information)     Communication ability:  Patient appears to be able to clearly communicate and understand verbal and written communication and follow directions correctly.  Treatment Explanation - The following has been discussed with the patient:   RX ordered/plan of care  Anticipated outcomes  Possible risks and side effects  This patient would benefit from PT intervention to resume normal activities.   Rehab potential is  good.    Frequency:  1 X week, once daily  Duration:  for 8 weeks  Discharge Plan:  Achieve all LTG.  Independent in home treatment program.  Reach maximal therapeutic benefit.    Please refer to the daily flowsheet for treatment today, total treatment time and time spent performing 1:1 timed codes.

## 2021-06-24 ENCOUNTER — TELEPHONE (OUTPATIENT)
Dept: FAMILY MEDICINE | Facility: CLINIC | Age: 51
End: 2021-06-24

## 2021-06-24 NOTE — TELEPHONE ENCOUNTER
----- Message from NADIA Reyes CNP sent at 6/23/2021  6:30 PM CDT -----  Likely hemangioma.  Derm referral placed.

## 2021-09-02 ENCOUNTER — OFFICE VISIT (OUTPATIENT)
Dept: DERMATOLOGY | Facility: CLINIC | Age: 51
End: 2021-09-02
Attending: NURSE PRACTITIONER
Payer: COMMERCIAL

## 2021-09-02 DIAGNOSIS — Q27.9 VENOUS MALFORMATION: Primary | ICD-10-CM

## 2021-09-02 DIAGNOSIS — K13.0 LESION OF LIP: ICD-10-CM

## 2021-09-02 PROCEDURE — 99204 OFFICE O/P NEW MOD 45 MIN: CPT | Mod: GC | Performed by: STUDENT IN AN ORGANIZED HEALTH CARE EDUCATION/TRAINING PROGRAM

## 2021-09-02 ASSESSMENT — PAIN SCALES - GENERAL: PAINLEVEL: NO PAIN (0)

## 2021-09-02 NOTE — LETTER
9/2/2021       RE: Janine Beverly  [address] [place] [address]     Dear Colleague,    Thank you for referring your patient, Janine Beverly, to the [facility] [place] at [facility]. Please see a copy of my visit note below.    [facility] Dermatology Clinic Note  Date: 09/02/21     Dermatology Problem List:  [facility], Tammy HINTON 9/2/21 (not groin)  1. Venous malformation of L lower lip, suspected  -referral to vascular clinic  2. Freckle   3. Solar lentigo     Assessment & Plan  #Venous malformation, lower lip (suspected)  Discussed benign nature of this lesion. Reviewed prior US of the lesion that showed hypervascularity. Discussed other manifestations, such as GI involvement, calcification of clots with tenderness in the lesion, involvement of other oral mucosa (though not present). Discussed options for treatment, such as IR involvement for sclerosis of lesion by injection, surgical excision (though cautioned this will alter cosmetic outcome), laser treatment. Discussed that patient can start taking aspirin if symptomatic.    Monitoring by patient    Referring to vascular lesion clinic    #Solar lentigo  #Seborrheic keratosis  #Nevi  #Cherry angioma  #Acrochordon  Reassured patient of benign etiology.    Counseled on monitoring for ABCDEs of melanoma    Counseled on moisturizing with good OTC moisturizer, such as Cerave, Vanicream, Vaseline, or Cetaphil    Patient to continue monitoring at home with self-skin exams    Counseled on sun protection and use of SPF 30+ sunscreen, UPF clothing, sun avoidance, tanning bed avoidance    Procedures:  None      Follow Up: Follow up as needed.    Staff: Clement    CC: NADIA Reyes CNP, [address] [place] [address] on close of this encounter.    Time: 30 minutes in total spent on the day of the encounter (including chart review,  patient visit, counseling, review of results, documentation, discussion with other providers, coordination of care, discussion with in chart review, patient visit, documentation, coordination of care, discussion with other providers, and/or discussion with family)    I have personally examined this patient and agree with Dr. Lau's documentation and plan of care. I have reviewed and amended the resident's note above. The documentation accurately reflects my clinical observations, diagnoses, treatment and follow-up plans.     Yu Vaughan MD  Dermatology Staff  _______________________________________  _______________________________________      Subjective  Ms. Janine Beverly is a 51 year old female who presents as a new patient for lower lip lesion.      Has had spot on lower lip since birth    Sometimes can be painful when leaning forward like during yoga    Otherwise has no issues of note on her body    Information is obtained from chart review and the patient, who seems to be a reliable informant. A review of systems was performed, and relevant findings are noted in the HPI. The patient is otherwise feeling well without additional skin concerns.  The patient's past medical history, past surgical history, immunizations, family history (no history of skin cancer), social history, allergies, and medications were reviewed and are noted, as relevant.    Objective  Vitals, labs, and imaging reviewed, as relevant.    General: well-appearing, in NAD  Eyes: Anicteric sclerae  Cardiovascular: Extremities well-perfused without digital cyanosis  Respiratory: Normal respiratory effort  Musculoskeletal: Moving extremities well  Neuro: Alert & oriented  Psych: Euthymic  Skin: Full body skin exam was performed, including: scalp, hair, face, eyelids, neck, lips, chest, back, abdomen, right upper extremity, left upper extremity, buttocks, groin, right lower extremity, left lower extremity.       On the L lower lip are 2 4mm  bluish papules on the L lower lip    On the trunk/extremities are skin-colored pedunculated papules with erythema     On the trunk/extremities are dome-shaped firm bright red papules.     On the trunk/extremities and sun-exposed areas are multiple regular brown pigmented macules and papules.     On the trunk/extremities are tan to brown waxy, stuck-on papules.     Tammy Lau MD  Resident Physician, PGY-2  Internal Medicine/Dermatology  Pager: [phone]

## 2021-09-02 NOTE — PROGRESS NOTES
[facility] Dermatology Clinic Note  Date: 09/02/21     Dermatology Problem List:  [facility], Tammy HINTON 9/2/21 (not groin)  1. Venous malformation of L lower lip, suspected  -referral to vascular clinic  2. Freckle   3. Solar lentigo     Assessment & Plan  #Venous malformation, lower lip (suspected)  Discussed benign nature of this lesion. Reviewed prior US of the lesion that showed hypervascularity. Discussed other manifestations, such as GI involvement, calcification of clots with tenderness in the lesion, involvement of other oral mucosa (though not present). Discussed options for treatment, such as IR involvement for sclerosis of lesion by injection, surgical excision (though cautioned this will alter cosmetic outcome), laser treatment. Discussed that patient can start taking aspirin if symptomatic.    Monitoring by patient    Referring to vascular lesion clinic    #Solar lentigo  #Seborrheic keratosis  #Nevi  #Cherry angioma  #Acrochordon  Reassured patient of benign etiology.    Counseled on monitoring for ABCDEs of melanoma    Counseled on moisturizing with good OTC moisturizer, such as Cerave, Vanicream, Vaseline, or Cetaphil    Patient to continue monitoring at home with self-skin exams    Counseled on sun protection and use of SPF 30+ sunscreen, UPF clothing, sun avoidance, tanning bed avoidance    Procedures:  None      Follow Up: Follow up as needed.    Staff: Clement    CC: NADIA Reyes [facility], [address] [place] [address] on close of this encounter.    Time: 30 minutes in total spent on the day of the encounter (including chart review, patient visit, counseling, review of results, documentation, discussion with other providers, coordination of care, discussion with in chart review, patient visit, documentation, coordination of care, discussion with other providers, and/or discussion with family)    I have personally examined this patient and agree with  Dr. Lau's documentation and plan of care. I have reviewed and amended the resident's note above. The documentation accurately reflects my clinical observations, diagnoses, treatment and follow-up plans.     Yu Vaughan MD  Dermatology Staff  _______________________________________  _______________________________________      Subjective  Ms. Janine Beverly is a 51 year old female who presents as a new patient for lower lip lesion.      Has had spot on lower lip since birth    Sometimes can be painful when leaning forward like during yoga    Otherwise has no issues of note on her body    Information is obtained from chart review and the patient, who seems to be a reliable informant. A review of systems was performed, and relevant findings are noted in the HPI. The patient is otherwise feeling well without additional skin concerns.  The patient's past medical history, past surgical history, immunizations, family history (no history of skin cancer), social history, allergies, and medications were reviewed and are noted, as relevant.    Objective  Vitals, labs, and imaging reviewed, as relevant.    General: well-appearing, in NAD  Eyes: Anicteric sclerae  Cardiovascular: Extremities well-perfused without digital cyanosis  Respiratory: Normal respiratory effort  Musculoskeletal: Moving extremities well  Neuro: Alert & oriented  Psych: Euthymic  Skin: Full body skin exam was performed, including: scalp, hair, face, eyelids, neck, lips, chest, back, abdomen, right upper extremity, left upper extremity, buttocks, groin, right lower extremity, left lower extremity.       On the L lower lip are 2 4mm bluish papules on the L lower lip    On the trunk/extremities are skin-colored pedunculated papules with erythema     On the trunk/extremities are dome-shaped firm bright red papules.     On the trunk/extremities and sun-exposed areas are multiple regular brown pigmented macules and papules.     On the trunk/extremities  are tan to brown waxy, stuck-on papules.     Tammy Lau MD  Resident Physician, PGY-2  Internal Medicine/Dermatology  Pager: [phone]

## 2021-09-13 DIAGNOSIS — Q27.9 VASCULAR MALFORMATION: Primary | ICD-10-CM

## 2021-09-14 DIAGNOSIS — Q27.9 VASCULAR MALFORMATION: Primary | ICD-10-CM

## 2021-09-15 ENCOUNTER — TELEPHONE (OUTPATIENT)
Dept: DERMATOLOGY | Facility: CLINIC | Age: 51
End: 2021-09-15

## 2021-09-15 NOTE — TELEPHONE ENCOUNTER
Attempted to contact patient to assist with scheduling MRI, ordered by Dr. Johnson, no answer, left message with direct number.

## 2021-09-19 ENCOUNTER — HEALTH MAINTENANCE LETTER (OUTPATIENT)
Age: 51
End: 2021-09-19

## 2021-10-03 ENCOUNTER — ANCILLARY PROCEDURE (OUTPATIENT)
Dept: MRI IMAGING | Facility: CLINIC | Age: 51
End: 2021-10-03
Attending: RADIOLOGY
Payer: COMMERCIAL

## 2021-10-03 DIAGNOSIS — Q27.9 VASCULAR MALFORMATION: ICD-10-CM

## 2021-10-03 PROCEDURE — 70543 MRI ORBT/FAC/NCK W/O &W/DYE: CPT | Mod: GC | Performed by: STUDENT IN AN ORGANIZED HEALTH CARE EDUCATION/TRAINING PROGRAM

## 2021-10-03 PROCEDURE — A9585 GADOBUTROL INJECTION: HCPCS | Performed by: STUDENT IN AN ORGANIZED HEALTH CARE EDUCATION/TRAINING PROGRAM

## 2021-10-03 RX ORDER — GADOBUTROL 604.72 MG/ML
7.5 INJECTION INTRAVENOUS ONCE
Status: COMPLETED | OUTPATIENT
Start: 2021-10-03 | End: 2021-10-03

## 2021-10-03 RX ADMIN — GADOBUTROL 7.5 ML: 604.72 INJECTION INTRAVENOUS at 11:34

## 2021-12-01 ENCOUNTER — OFFICE VISIT (OUTPATIENT)
Dept: DERMATOLOGY | Facility: CLINIC | Age: 51
End: 2021-12-01
Attending: DERMATOLOGY
Payer: COMMERCIAL

## 2021-12-01 VITALS — BODY MASS INDEX: 31.27 KG/M2 | WEIGHT: 177.91 LBS

## 2021-12-01 DIAGNOSIS — Q27.9 VENOUS MALFORMATION: Primary | ICD-10-CM

## 2021-12-01 PROCEDURE — 99214 OFFICE O/P EST MOD 30 MIN: CPT | Performed by: DERMATOLOGY

## 2021-12-01 PROCEDURE — G0463 HOSPITAL OUTPT CLINIC VISIT: HCPCS

## 2021-12-01 NOTE — PROGRESS NOTES
[facility] Center for Vascular Lesions  Dermatology Patient Visit Note      The goal of this multi-specialty clinic is to provide comprehensive care for children and adults with complex vascular lesions. Imaging studies and patient history are reviewed together by the group just prior to the patient clinic visit.  Participating specialists include:    ENT: Dr. Alexi Stewart   General surgery: Dr. Kwan Beth and Paul Fernandez  Plastic Surgery: Dr. Tirso Castano  Heme/Onc: Dr. Melanie Armijo   Ob/Gyn: Dr. Regla Osman  Pediatric Dermatology: Aggie Pickard, Yu Vaughan, Doris Zavaleta, Mary Rebollar  Interventional Radiology: Dr. Yanelis Johnson  Radiology: Dr. Amilcar Peters  Genetics: Dr. Akosua Jacome      S: Ms. 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 The lesion was present at birth but has increased in size over time and deepened in coloration. It is bothersome with eating and patient notes that it is uncomfortable. No history of bleeding. No other similar spots. Had a treatment with electrocautery many years ago.     O:  Alert, no distress  Skin with an approx 1 cm blue hued subcutaneous nodule on the L lower mucosal lip     A/P:  Venous malformation of the L lower lip. Increasing in size. Review of MRI and US shows a primary venous malformation. Given the symptoms we discussed treatment with sclerotherapy or nd Yag laser. Noted that there is risk of ulceration and multiple treatments would be needed. Patient would like to proceed with ND Yag. We will reach ou to insurance to determine coverage.     Yu Vaughan MD  Dermatology  [facility]

## 2021-12-01 NOTE — PATIENT INSTRUCTIONS
VASCULAR ANOMALIES CLINIC, [facility] [place] 3rd [facility]     Today you were seen in our Pediatric Vascular Lesions Clinic by one or several of the Physicians listed below:      Dr. Parul Picakrd and Dr. Doris Zavaleta, Dr. Yu Vaughan & Dr. Mary Rebollar (Pediatric Dermatology) #[phone]    Dr. Kwan Beth and Dr. Dileep Fernandez (Pediatric Surgeon) # [phone]    Dr. Tirso Castano (Plastic and Reconstructive Surgery) # [phone]    Dr. Alexi Stewart (Pediatric Otolaryngology) # [phone]    Dr. Yanelis Johnson (Pediatric Interventional Radiology) # [phone]    Dr. Melanie Armijo and Elly Crook N.P. (Pediatric Hematologist-Oncology) # [phone]    Dr. Domingo Parsons (Pediatric Ophthalmology) # [phone]     Dr. Regla Osman (OBGYN) # [phone] or [phone] (urgent concerns)    Dr. Ebenezer Figueroa (Pediatric Orthopaedic Surgeon) # [phone]    Dr. Aimee Jacome (Genetics) & Khalida Hernandez (Genetic Counselor) # [phone]- for appointments & # [phone]-for nurse questions        Clinic phone numbers have been provided should you need to call to set up any appointments with one of the specific providers in the future.     You may have additional co-pays for provider consults who will be directly involved in your care.     If additional imaging is recommended, please call [phone] or [phone] to schedule these appointments.     Thank you for your participation in the [facility]'s Vascular Lesions Clinic!

## 2021-12-01 NOTE — NURSING NOTE
Chief Complaint   Patient presents with     New Patient     vascular lesion     Stephanie Parekh, CMA

## 2021-12-01 NOTE — LETTER
12/1/2021      RE: Janine Beverly  [address] [place] [address]       [facility] Center for Vascular Lesions  Dermatology Patient Visit Note      The goal of this multi-specialty clinic is to provide comprehensive care for children and adults with complex vascular lesions. Imaging studies and patient history are reviewed together by the group just prior to the patient clinic visit.  Participating specialists include:    ENT: Dr. Alexi Stewart   General surgery: Dr. Kwna Beth and Paul Fernandez  Plastic Surgery: Dr. Tirso Castano  Heme/Onc: Dr. Melanie Armijo   Ob/Gyn: Dr. Regla Osman  Pediatric Dermatology: Aggie Pickard, Yu Vaughan, Doris Zavaleta, Mary Rebollar  Interventional Radiology: Dr. Yanelis Johnson  Radiology: Dr. Amilcar Peters  Genetics: Dr. Akosua Jacome      S: Ms. 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 The lesion was present at birth but has increased in size over time and deepened in coloration. It is bothersome with eating and patient notes that it is uncomfortable. No history of bleeding. No other similar spots. Had a treatment with electrocautery many years ago.     O:  Alert, no distress  Skin with an approx 1 cm blue hued subcutaneous nodule on the L lower mucosal lip     A/P:  Venous malformation of the L lower lip. Increasing in size. Review of MRI and US shows a primary venous malformation. Given the symptoms we discussed treatment with sclerotherapy or nd Yag laser. Noted that there is risk of ulceration and multiple treatments would be needed. Patient would like to proceed with ND Yag. We will reach ou to insurance to determine coverage.     Yu Vaughan MD  Dermatology  [facility]        Yu Vaughan MD

## 2021-12-01 NOTE — LETTER
12/1/2021      RE: Janine Beverly  [address] [place] [address]       [facility] Center for Vascular Lesions  Dermatology Patient Visit Note      The goal of this multi-specialty clinic is to provide comprehensive care for children and adults with complex vascular lesions. Imaging studies and patient history are reviewed together by the group just prior to the patient clinic visit.  Participating specialists include:    ENT: Dr. Alexi Stewart   General surgery: Dr. Kwan Beth and Paul Fernandez  Plastic Surgery: Dr. Tirso Castano  Heme/Onc: Dr. Melanie Armijo   Ob/Gyn: Dr. Regla Osman  Pediatric Dermatology: Aggie Pickard, Yu Vaughan, Doris Zavaleta, Mary Rebollar  Interventional Radiology: Dr. Yanelis Johnson  Radiology: Dr. Amilcar Peters  Genetics: Dr. Akosua Jacome      S: Ms. 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 The lesion was present at birth but has increased in size over time and deepened in coloration. It is bothersome with eating and patient notes that it is uncomfortable. No history of bleeding. No other similar spots. Had a treatment with electrocautery many years ago.     O:  Alert, no distress  Skin with an approx 1 cm blue hued subcutaneous nodule on the L lower mucosal lip     A/P:  Venous malformation of the L lower lip. Increasing in size. Review of MRI and US shows a primary venous malformation. Given the symptoms we discussed treatment with sclerotherapy or nd Yag laser. Noted that there is risk of ulceration and multiple treatments would be needed. Patient would like to proceed with ND Yag. We will reach ou to insurance to determine coverage.     Yu Vaughan MD  Dermatology  [facility]        Yu Vaughan MD

## 2022-01-14 ENCOUNTER — OFFICE VISIT (OUTPATIENT)
Dept: DERMATOLOGY | Facility: CLINIC | Age: 52
End: 2022-01-14
Attending: DERMATOLOGY
Payer: COMMERCIAL

## 2022-01-14 VITALS — BODY MASS INDEX: 31.27 KG/M2 | WEIGHT: 177.91 LBS

## 2022-01-14 DIAGNOSIS — Q27.9 VENOUS MALFORMATION: Primary | ICD-10-CM

## 2022-01-14 PROCEDURE — 17106 DSTR CUT VSC PRLF LES<10SQCM: CPT | Performed by: DERMATOLOGY

## 2022-01-14 PROCEDURE — G0463 HOSPITAL OUTPT CLINIC VISIT: HCPCS | Mod: 25

## 2022-01-14 ASSESSMENT — PAIN SCALES - GENERAL: PAINLEVEL: NO PAIN (0)

## 2022-01-14 NOTE — LETTER
2022      RE: Janine Beverly  [address] [place] [address]       [facility]   Pediatric Dermatologic Laser Surgery   Procedure Note       Patient Name:  Janine Beverly  Patient :  1970  Medical Record #: [id number]  Date of Operation: 2022    Past Medical History:   Diagnosis Date     Hypertension      Thyroid disease      Wt 80.7 kg (177 lb 14.6 oz)   BMI 31.27 kg/m    ND YAG laser of lower lip venous malformation  Risk of blister formation, scarring and edema formation discussed.    SURGEON:  Doris Zavaleta MD    ASSISTANT:  N/A    PREOPERATIVE DIAGNOSIS:  Venous malformation    POSTOPERATIVE DIAGNOSIS:  Same    INDICATION: treatment of facial VM of the lower lip    PROCEDURE PERFORMED:  ND-YAG laser    PROCEDURE:   H & P reviewed prior to the procedure.  After discussion of risks and benefits of the procedure including the presence of pain, blister formation, scarring,  pigmentary changes or bruising following the procedure, written consent was obtained from the patient, and the patient was taken to the procedure room. Topical anesthesia was induced with LMX for 30 minutes.  The patient was placed in the supine position.  Appropriate eyewear in place for all in attendance.  The lesion on the lower lip was delineated by a marking pen.     SITE: lower lip   SPOT SIZE: 12 mm  FLUENCE: 24 J/cm2  PULSE DURATION: 3 ms  PULSE NUMBER: 3 pulses  COOLIN/20  TOTAL AREA TREATED: <10 sq cm.  NOTES:   Vaseline and ice pack applied after procedure and while in recovery.     There were no complications to the procedure or abnormal findings.  Post-op care discussed including sun protection, use of analgesia.  Follow up in 4 weeks for re-treatment.    Doris Zavaleta MD    of Dermatology & Pediatrics   Pediatric Dermatology   2022              Doris Zavaleta MD

## 2022-01-14 NOTE — PATIENT INSTRUCTIONS
[facility]- Pediatric Dermatology  Dr. Parul Pickard, Dr. Doris Zavaleta, Dr. Yu Vaughan, Dr. Mary Rebollar, TORIE Pollock Dr., Dr. Uma Suh & Dr. Julien Saunders       Non Urgent  Nurse Triage Line; [phone]- [facility] Care Coordinators      Amy (/Complex ) [phone]      If you need a prescription refill, please contact your pharmacy. Refills are approved or denied by our Physicians during normal business hours, Monday through Fridays    Per office policy, refills will not be granted if you have not been seen within the past year (or sooner depending on your child's condition)      Scheduling Information:     Pediatric Appointment Scheduling and Call Center [phone]   Radiology Scheduling- [phone]     Sedation Unit Scheduling- [phone]    [place] Scheduling- [facility] [phone]; Pediatric Dermatology Clinic [phone]    Main  Services: [phone]   Tajik: [phone]   Fijian: [phone]   Hmong/Darron/Estonian: [phone]      Preadmission Nursing Department Fax Number: [phone] (Fax all pre-operative paperwork to this number)      For urgent matters arising during evenings, weekends, or holidays that cannot wait for normal business hours please call [phone] and ask for the Dermatology Resident On-Call to be paged.

## 2022-01-14 NOTE — PROGRESS NOTES
[facility]   Pediatric Dermatologic Laser Surgery   Procedure Note       Patient Name:  Janine Beverly  Patient :  1970  Medical Record #: [id number]  Date of Operation: 2022    Past Medical History:   Diagnosis Date     Hypertension      Thyroid disease      Wt 80.7 kg (177 lb 14.6 oz)   BMI 31.27 kg/m    ND YAG laser of lower lip venous malformation  Risk of blister formation, scarring and edema formation discussed.    SURGEON:  Doris Zavaleta MD    ASSISTANT:  N/A    PREOPERATIVE DIAGNOSIS:  Venous malformation    POSTOPERATIVE DIAGNOSIS:  Same    INDICATION: treatment of facial VM of the lower lip    PROCEDURE PERFORMED:  ND-YAG laser    PROCEDURE:   H & P reviewed prior to the procedure.  After discussion of risks and benefits of the procedure including the presence of pain, blister formation, scarring,  pigmentary changes or bruising following the procedure, written consent was obtained from the patient, and the patient was taken to the procedure room. Topical anesthesia was induced with LMX for 30 minutes.  The patient was placed in the supine position.  Appropriate eyewear in place for all in attendance.  The lesion on the lower lip was delineated by a marking pen.     SITE: lower lip   SPOT SIZE: 12 mm  FLUENCE: 24 J/cm2  PULSE DURATION: 3 ms  PULSE NUMBER: 3 pulses  COOLIN/20  TOTAL AREA TREATED: <10 sq cm.  NOTES:   Vaseline and ice pack applied after procedure and while in recovery.     There were no complications to the procedure or abnormal findings.  Post-op care discussed including sun protection, use of analgesia.  Follow up in 4 weeks for re-treatment.    Doris Zavaleta MD    of Dermatology & Pediatrics   Pediatric Dermatology   2022

## 2022-01-14 NOTE — NURSING NOTE
"[facility] [[id number]]  Chief Complaint   Patient presents with     Procedure     Venous Malformation.     Initial Wt 177 lb 14.6 oz (80.7 kg)   BMI 31.27 kg/m   Estimated body mass index is 31.27 kg/m  as calculated from the following:    Height as of 6/10/21: 5' 3.25\" (160.7 cm).    Weight as of this encounter: 177 lb 14.6 oz (80.7 kg).  Medication Reconciliation: complete    Has the patient received a flu shot this year? Yes    If no, do they want one today? No    Vishal Pathak CMA    "

## 2022-02-11 ENCOUNTER — OFFICE VISIT (OUTPATIENT)
Dept: DERMATOLOGY | Facility: CLINIC | Age: 52
End: 2022-02-11
Attending: DERMATOLOGY
Payer: COMMERCIAL

## 2022-02-11 VITALS — BODY MASS INDEX: 31.27 KG/M2 | WEIGHT: 177.91 LBS

## 2022-02-11 DIAGNOSIS — Q27.9 VENOUS MALFORMATION: Primary | ICD-10-CM

## 2022-02-11 PROCEDURE — G0463 HOSPITAL OUTPT CLINIC VISIT: HCPCS | Mod: 25

## 2022-02-11 PROCEDURE — 17106 DSTR CUT VSC PRLF LES<10SQCM: CPT | Performed by: DERMATOLOGY

## 2022-02-11 ASSESSMENT — PAIN SCALES - GENERAL: PAINLEVEL: NO PAIN (0)

## 2022-02-11 NOTE — LETTER
2022      RE: Janine Beverly  [address] [place] [address]         [facility]   Pediatric Dermatologic Laser Surgery   Procedure Note         Patient Name:              Janine Beverly  Patient :                1970  Medical Record #:       [id number]  Date of Operation:      2022       Past Medical History        Past Medical History:   Diagnosis Date     Hypertension       Thyroid disease             ND YAG laser of lower lip venous malformation, second treatment today  Nice reduction in size after first treatment at conservative settings last month  Risk of blister formation, scarring and edema formation discussed.     SURGEON:  Doris Zavaleta MD     ASSISTANT:  N/A     PREOPERATIVE DIAGNOSIS:  Venous malformation     POSTOPERATIVE DIAGNOSIS:  Same     INDICATION: treatment of facial VM of the lower lip x2   PROCEDURE PERFORMED:  ND-YAG laser     PROCEDURE:   H & P reviewed prior to the procedure.  After discussion of risks and benefits of the procedure including the presence of pain, blister formation, scarring,  pigmentary changes or bruising following the procedure, written consent was obtained from the patient, and the patient was taken to the procedure room. Topical anesthesia was induced with LMX for 30 minutes.  The patient was placed in the supine position.  Appropriate eyewear in place for all in attendance.  The lesion on the lower lip was delineated by a marking pen.      SITE: lower lip   SPOT SIZE: 12 mm  FLUENCE: 28 J/cm2  PULSE DURATION: 3 ms  PULSE NUMBER: 3 pulses  COOLIN/20  TOTAL AREA TREATED: <10 sq cm.  NOTES:   Vaseline and ice pack applied after procedure and while in recovery.      There were no complications to the procedure or abnormal findings.  Post-op care discussed including sun protection, use of analgesia.  Follow up in 4 weeks for re-treatment.     Doris Zavaleta MD   Assistant  Professor of Dermatology & Pediatrics   Pediatric Dermatology   01/14/2022

## 2022-02-11 NOTE — PATIENT INSTRUCTIONS
[facility]- Pediatric Dermatology  Dr. Parul Pickard, Dr. Doris Zavaleta, Dr. Yu Vaughan, Dr. Mary Rebollar, TORIE Pollock Dr., Dr. Uma Suh & Dr. Julien Saunders       Non Urgent  Nurse Triage Line; [phone]- [facility] Care Coordinators      Amy (/Complex ) [phone]      If you need a prescription refill, please contact your pharmacy. Refills are approved or denied by our Physicians during normal business hours, Monday through Fridays    Per office policy, refills will not be granted if you have not been seen within the past year (or sooner depending on your child's condition)      Scheduling Information:     Pediatric Appointment Scheduling and Call Center [phone]   Radiology Scheduling- [phone]     Sedation Unit Scheduling- [phone]    [place] Scheduling- [facility] [phone]; Pediatric Dermatology Clinic [phone]    Main  Services: [phone]   Slovak: [phone]   Slovak: [phone]   Hmong/Darron/Albanian: [phone]      Preadmission Nursing Department Fax Number: [phone] (Fax all pre-operative paperwork to this number)      For urgent matters arising during evenings, weekends, or holidays that cannot wait for normal business hours please call [phone] and ask for the Dermatology Resident On-Call to be paged.

## 2022-02-11 NOTE — PROGRESS NOTES
[facility]   Pediatric Dermatologic Laser Surgery   Procedure Note         Patient Name:              Janine Beverly  Patient :                1970  Medical Record #:       [id number]  Date of Operation:      2022       Past Medical History        Past Medical History:   Diagnosis Date     Hypertension       Thyroid disease             ND YAG laser of lower lip venous malformation, second treatment today  Nice reduction in size after first treatment at conservative settings last month  Risk of blister formation, scarring and edema formation discussed.     SURGEON:  Doris Zavaleta MD     ASSISTANT:  N/A     PREOPERATIVE DIAGNOSIS:  Venous malformation     POSTOPERATIVE DIAGNOSIS:  Same     INDICATION: treatment of facial VM of the lower lip x2   PROCEDURE PERFORMED:  ND-YAG laser     PROCEDURE:   H & P reviewed prior to the procedure.  After discussion of risks and benefits of the procedure including the presence of pain, blister formation, scarring,  pigmentary changes or bruising following the procedure, written consent was obtained from the patient, and the patient was taken to the procedure room. Topical anesthesia was induced with LMX for 30 minutes.  The patient was placed in the supine position.  Appropriate eyewear in place for all in attendance.  The lesion on the lower lip was delineated by a marking pen.      SITE: lower lip   SPOT SIZE: 12 mm  FLUENCE: 28 J/cm2  PULSE DURATION: 3 ms  PULSE NUMBER: 3 pulses  COOLIN/20  TOTAL AREA TREATED: <10 sq cm.  NOTES:   Vaseline and ice pack applied after procedure and while in recovery.      There were no complications to the procedure or abnormal findings.  Post-op care discussed including sun protection, use of analgesia.  Follow up in 4 weeks for re-treatment.     Doris Zavaleta MD    of Dermatology & Pediatrics   Pediatric Dermatology   2022

## 2022-02-11 NOTE — NURSING NOTE
"[facility] [[id number]]  Chief Complaint   Patient presents with     Procedure     Symptomatic Venous Malformation.     Initial Wt 177 lb 14.6 oz (80.7 kg)   BMI 31.27 kg/m   Estimated body mass index is 31.27 kg/m  as calculated from the following:    Height as of 6/10/21: 5' 3.25\" (160.7 cm).    Weight as of this encounter: 177 lb 14.6 oz (80.7 kg).  Medication Reconciliation: complete    Has the patient received a flu shot this year? Yes    If no, do they want one today? No    Vishal Pathak CMA    "

## 2022-02-11 NOTE — NURSING NOTE
Pause for the cause has been completed prior to ngYAG on lower lip.   1. Janine was identified by both name and date of birth -  YES.   2. The correct site was identified -  YES.   3. Site marked by provider - YES.   4. Written informed consent correct and signed or verbal authorization  to proceed is obtained -  YES.   5. Verify necessary supplies, equipment, and diagnostics are available -  YES.   6. Time out is performed immediately prior to procedure -  YES.    Gumaro Lockwood, EMT

## 2022-03-23 DIAGNOSIS — E03.9 HYPOTHYROIDISM, UNSPECIFIED TYPE: ICD-10-CM

## 2022-03-23 DIAGNOSIS — I10 ESSENTIAL HYPERTENSION WITH GOAL BLOOD PRESSURE LESS THAN 140/90: ICD-10-CM

## 2022-03-24 RX ORDER — LISINOPRIL 10 MG/1
TABLET ORAL
Qty: 90 TABLET | Refills: 0 | Status: SHIPPED | OUTPATIENT
Start: 2022-03-24 | End: 2022-04-25

## 2022-03-24 RX ORDER — LEVOTHYROXINE SODIUM 112 MCG
TABLET ORAL
Qty: 90 TABLET | Refills: 0 | Status: SHIPPED | OUTPATIENT
Start: 2022-03-24 | End: 2022-04-25

## 2022-03-24 NOTE — TELEPHONE ENCOUNTER
Routing refill request to provider for review/approval because:  Labs not current:  Creatinine, potassium, TSH    Creatinine   Date Value Ref Range Status   02/04/2020 0.72 0.52 - 1.04 mg/dL Final     Potassium   Date Value Ref Range Status   02/04/2020 4.0 3.4 - 5.3 mmol/L Final     TSH   Date Value Ref Range Status   02/04/2020 1.61 0.40 - 4.00 mU/L Final     Upcoming appt 4/25/22 with Kimberly Guzmán.    Keke Tellez RN  [facility]

## 2022-04-01 ENCOUNTER — OFFICE VISIT (OUTPATIENT)
Dept: DERMATOLOGY | Facility: CLINIC | Age: 52
End: 2022-04-01
Attending: DERMATOLOGY
Payer: COMMERCIAL

## 2022-04-01 DIAGNOSIS — Q27.9 VENOUS MALFORMATION: Primary | ICD-10-CM

## 2022-04-01 PROCEDURE — G0463 HOSPITAL OUTPT CLINIC VISIT: HCPCS | Mod: 25

## 2022-04-01 PROCEDURE — 17106 DSTR CUT VSC PRLF LES<10SQCM: CPT | Performed by: DERMATOLOGY

## 2022-04-01 NOTE — LETTER
2022      RE: Janine Beverly  [address] [place] [address]       [facility]   Pediatric Dermatologic Laser Surgery   Procedure Note         Patient Name:              Janine Beverly  Patient :                1970  Medical Record #:       [id number]  Date of Operation:      2022          Past Medical History           Past Medical History:   Diagnosis Date     Hypertension       Thyroid disease              ND YAG laser of lower lip venous malformation, third treatment today  Nice reduction in size after first treatment at conservative settings last month  Risk of blister formation, scarring and edema formation discussed. We are happy with the interval improvement, and ready for third treatment today     SURGEON:  Doris Zavaleta MD     ASSISTANT:  N/A     PREOPERATIVE DIAGNOSIS:  Venous malformation     POSTOPERATIVE DIAGNOSIS:  Same     INDICATION: treatment of facial VM of the lower lip x3   PROCEDURE PERFORMED:  ND-YAG laser     PROCEDURE:   H & P reviewed prior to the procedure.  After discussion of risks and benefits of the procedure including the presence of pain, blister formation, scarring,  pigmentary changes or bruising following the procedure, written consent was obtained from the patient, and the patient was taken to the procedure room. Topical anesthesia was induced with LMX for 30 minutes.  The patient was placed in the supine position.  Appropriate eyewear in place for all in attendance.  The lesion on the lower lip was delineated by a marking pen.      SITE: lower lip   SPOT SIZE: 12 mm  FLUENCE: 30 J/cm2  PULSE DURATION: 3 ms  PULSE NUMBER: 4 pulses  COOLIN/20  TOTAL AREA TREATED: <10 sq cm.  NOTES:   Vaseline and ice pack applied after procedure and while in recovery.      There were no complications to the procedure or abnormal findings.  Post-op care discussed including sun protection, use of analgesia.  Follow  up in 4 weeks for re-treatment as desired     Doris Zavaleta MD    of Dermatology & Pediatrics   Pediatric Dermatology                 Pause for the cause has been completed prior to Nd Yag on lower lip.   1. Janine was identified by both name and date of birth -  YES.   2. The correct site was identified -  YES.   3. Site marked by provider - YES.   4. Written informed consent correct and signed or verbal authorization  to proceed is obtained -  YES.   5. Verify necessary supplies, equipment, and diagnostics are available -  YES.   6. Time out is performed immediately prior to procedure -  YES.        Doris Zavaleta MD

## 2022-04-01 NOTE — PATIENT INSTRUCTIONS
[facility]- Pediatric Dermatology  Dr. Parul Pickard, Dr. Doris Zavaleta, Dr. Yu Vaughan, Dr. Mary Rebollar, TORIE Pollock Dr., Dr. Uma Suh & Dr. Julien Saunders       Non Urgent  Nurse Triage Line; [phone]- [facility]uh and [facility] HUGG[facility] Care Coordinators      Amy (/Complex ) [phone]      If you need a prescription refill, please contact your pharmacy. Refills are approved or denied by our Physicians during normal business hours, Monday through Fridays    Per office policy, refills will not be granted if you have not been seen within the past year (or sooner depending on your child's condition)      Scheduling Information:     Pediatric Appointment Scheduling and Call Center [phone]   Radiology Scheduling- [phone]     Sedation Unit Scheduling- [phone]    [place] Scheduling- [facility] [phone]; Pediatric Dermatology Clinic [phone]    Main  Services: [phone]   Welsh: [phone]   Solomon Islander: [phone]   Hmong/Darron/Yakut: [phone]      Preadmission Nursing Department Fax Number: [phone] (Fax all pre-operative paperwork to this number)      For urgent matters arising during evenings, weekends, or holidays that cannot wait for normal business hours please call [phone] and ask for the Dermatology Resident On-Call to be paged.

## 2022-04-01 NOTE — PROGRESS NOTES
[facility]   Pediatric Dermatologic Laser Surgery   Procedure Note         Patient Name:              Janine Beverly  Patient :                1970  Medical Record #:       [id number]  Date of Operation:      2022          Past Medical History           Past Medical History:   Diagnosis Date     Hypertension       Thyroid disease              ND YAG laser of lower lip venous malformation, third treatment today  Nice reduction in size after first treatment at conservative settings last month  Risk of blister formation, scarring and edema formation discussed. We are happy with the interval improvement, and ready for third treatment today     SURGEON:  Doris Zavaleta MD     ASSISTANT:  N/A     PREOPERATIVE DIAGNOSIS:  Venous malformation     POSTOPERATIVE DIAGNOSIS:  Same     INDICATION: treatment of facial VM of the lower lip x3   PROCEDURE PERFORMED:  ND-YAG laser     PROCEDURE:   H & P reviewed prior to the procedure.  After discussion of risks and benefits of the procedure including the presence of pain, blister formation, scarring,  pigmentary changes or bruising following the procedure, written consent was obtained from the patient, and the patient was taken to the procedure room. Topical anesthesia was induced with LMX for 30 minutes.  The patient was placed in the supine position.  Appropriate eyewear in place for all in attendance.  The lesion on the lower lip was delineated by a marking pen.      SITE: lower lip   SPOT SIZE: 12 mm  FLUENCE: 30 J/cm2  PULSE DURATION: 3 ms  PULSE NUMBER: 4 pulses  COOLIN/20  TOTAL AREA TREATED: <10 sq cm.  NOTES:   Vaseline and ice pack applied after procedure and while in recovery.      There were no complications to the procedure or abnormal findings.  Post-op care discussed including sun protection, use of analgesia.  Follow up in 4 weeks for re-treatment as desired     Doris Zavaleta MD   Assistant  Professor of Dermatology & Pediatrics   Pediatric Dermatology

## 2022-04-13 ENCOUNTER — ANCILLARY PROCEDURE (OUTPATIENT)
Dept: MAMMOGRAPHY | Facility: CLINIC | Age: 52
End: 2022-04-13
Attending: NURSE PRACTITIONER
Payer: COMMERCIAL

## 2022-04-13 DIAGNOSIS — Z12.31 VISIT FOR SCREENING MAMMOGRAM: ICD-10-CM

## 2022-04-13 PROCEDURE — 77067 SCR MAMMO BI INCL CAD: CPT | Mod: GC | Performed by: STUDENT IN AN ORGANIZED HEALTH CARE EDUCATION/TRAINING PROGRAM

## 2022-04-13 PROCEDURE — 77063 BREAST TOMOSYNTHESIS BI: CPT | Mod: GC | Performed by: STUDENT IN AN ORGANIZED HEALTH CARE EDUCATION/TRAINING PROGRAM

## 2022-04-25 ENCOUNTER — OFFICE VISIT (OUTPATIENT)
Dept: FAMILY MEDICINE | Facility: CLINIC | Age: 52
End: 2022-04-25
Payer: COMMERCIAL

## 2022-04-25 VITALS
RESPIRATION RATE: 20 BRPM | HEART RATE: 60 BPM | BODY MASS INDEX: 31.08 KG/M2 | OXYGEN SATURATION: 99 % | DIASTOLIC BLOOD PRESSURE: 80 MMHG | SYSTOLIC BLOOD PRESSURE: 110 MMHG | TEMPERATURE: 98.2 F | WEIGHT: 175.4 LBS | HEIGHT: 63 IN

## 2022-04-25 DIAGNOSIS — Z13.6 CARDIOVASCULAR SCREENING; LDL GOAL LESS THAN 160: ICD-10-CM

## 2022-04-25 DIAGNOSIS — I10 ESSENTIAL HYPERTENSION WITH GOAL BLOOD PRESSURE LESS THAN 140/90: ICD-10-CM

## 2022-04-25 DIAGNOSIS — Z13.21 ENCOUNTER FOR VITAMIN DEFICIENCY SCREENING: ICD-10-CM

## 2022-04-25 DIAGNOSIS — Z00.00 ROUTINE GENERAL MEDICAL EXAMINATION AT A HEALTH CARE FACILITY: Primary | ICD-10-CM

## 2022-04-25 DIAGNOSIS — E03.4 HYPOTHYROIDISM DUE TO ACQUIRED ATROPHY OF THYROID: ICD-10-CM

## 2022-04-25 PROCEDURE — 99214 OFFICE O/P EST MOD 30 MIN: CPT | Mod: 25 | Performed by: NURSE PRACTITIONER

## 2022-04-25 PROCEDURE — 99396 PREV VISIT EST AGE 40-64: CPT | Performed by: NURSE PRACTITIONER

## 2022-04-25 RX ORDER — LEVOTHYROXINE SODIUM 112 MCG
112 TABLET ORAL DAILY
Qty: 90 TABLET | Refills: 3 | Status: SHIPPED | OUTPATIENT
Start: 2022-04-25 | End: 2023-04-26

## 2022-04-25 RX ORDER — LISINOPRIL 10 MG/1
10 TABLET ORAL DAILY
Qty: 90 TABLET | Refills: 3 | Status: SHIPPED | OUTPATIENT
Start: 2022-04-25 | End: 2023-04-26

## 2022-04-25 ASSESSMENT — ENCOUNTER SYMPTOMS
DIZZINESS: 0
ABDOMINAL PAIN: 0
BREAST MASS: 0
DYSURIA: 0
HEADACHES: 0
HEMATURIA: 0
JOINT SWELLING: 0
PARESTHESIAS: 0
FREQUENCY: 0
NERVOUS/ANXIOUS: 0
DIARRHEA: 0
ARTHRALGIAS: 0
WEAKNESS: 0
NAUSEA: 0
SORE THROAT: 0
HEARTBURN: 0
MYALGIAS: 0
COUGH: 0
HEMATOCHEZIA: 0
EYE PAIN: 0
CONSTIPATION: 0
SHORTNESS OF BREATH: 0
FEVER: 0
CHILLS: 0
PALPITATIONS: 0

## 2022-04-25 NOTE — PROGRESS NOTES
SUBJECTIVE:   CC: Janine Beverly is an 51 year old woman who presents for preventive health visit.       Patient has been advised of split billing requirements and indicates understanding: Yes  Healthy Habits:     Getting at least 3 servings of Calcium per day:  NO    Bi-annual eye exam:  Yes    Dental care twice a year:  Yes    Sleep apnea or symptoms of sleep apnea:  None    Diet:  Regular (no restrictions)    Frequency of exercise:  6-7 days/week    Duration of exercise:  45-60 minutes    Taking medications regularly:  Yes    Medication side effects:  Not applicable    PHQ-2 Total Score: 0    Additional concerns today:  No        Feeling good overall.    Had pap last year through [facility], was normal    Recent dermatology skin check and receiving laser treatment to lip.    Up-to-date on mammogram and colon cancer screening.      HTN well controlled    Having some night sweat issues that improved with multivitamin.  Not sure if menopausal yet- no other symptoms and not getting period due to IUD.     No skin, hair, or digestive changes.  Occasional fatigue.      Today's PHQ-2 Score:   PHQ-2 ( 1999 Pf[employer]) 4/25/2022   Q1: Little interest or pleasure in doing things 0   Q2: Feeling down, depressed or hopeless 0   PHQ-2 Score 0   PHQ-2 Total Score (12-17 Years)- Positive if 3 or more points; Administer PHQ-A if positive -   Q1: Little interest or pleasure in doing things Not at all   Q2: Feeling down, depressed or hopeless Not at all   PHQ-2 Score 0       Abuse: Current or Past (Physical, Sexual or Emotional) - No  Do you feel safe in your environment? Yes    Have you ever done Advance Care Planning? (For example, a Health Directive, POLST, or a discussion with a medical provider or your loved ones about your wishes): Yes, patient states has an Advance Care Planning document and will bring a copy to the clinic.    Social History     Tobacco Use     Smoking status: Never Smoker     Smokeless tobacco: Never Used    Substance Use Topics     Alcohol use: Yes     If you drink alcohol do you typically have >3 drinks per day or >7 drinks per week? No    Alcohol Use 4/25/2022   Prescreen: >3 drinks/day or >7 drinks/week? No       Reviewed orders with patient.  Reviewed health maintenance and updated orders accordingly - Yes  Lab work is in process    Breast Cancer Screening:    FHS-7:   Breast CA Risk Assessment (FHS-7) 4/13/2022 4/25/2022   Did any of your first-degree relatives have breast or ovarian cancer? No No   Did any of your relatives have bilateral breast cancer? No Unknown   Did any man in your family have breast cancer? No No   Did any woman in your family have breast and ovarian cancer? No Yes   Did any woman in your family have breast cancer before age 50 y? No Unknown   Do you have 2 or more relatives with breast and/or ovarian cancer? No No   Do you have 2 or more relatives with breast and/or bowel cancer? No No       Mammogram Screening: Recommended annual mammography  Pertinent mammograms are reviewed under the imaging tab.    History of abnormal Pap smear: NO - age 30-65 PAP every 5 years with negative HPV co-testing recommended     Reviewed and updated as needed this visit by clinical staff                    Reviewed and updated as needed this visit by Provider                       Review of Systems   Constitutional: Negative for chills and fever.   HENT: Negative for congestion, ear pain, hearing loss and sore throat.    Eyes: Negative for pain and visual disturbance.   Respiratory: Negative for cough and shortness of breath.    Cardiovascular: Negative for chest pain, palpitations and peripheral edema.   Gastrointestinal: Negative for abdominal pain, constipation, diarrhea, heartburn, hematochezia and nausea.   Breasts:  Negative for tenderness, breast mass and discharge.   Genitourinary: Negative for dysuria, frequency, genital sores, hematuria, pelvic pain, urgency, vaginal bleeding and vaginal discharge.    Musculoskeletal: Negative for arthralgias, joint swelling and myalgias.   Skin: Negative for rash.   Neurological: Negative for dizziness, weakness, headaches and paresthesias.   Psychiatric/Behavioral: Negative for mood changes. The patient is not nervous/anxious.           OBJECTIVE:   There were no vitals taken for this visit.  Physical Exam  GENERAL: healthy, alert and no distress  EYES: Eyes grossly normal to inspection, PERRL and conjunctivae and sclerae normal  HENT: ear canals and TM's normal, nose and mouth without ulcers or lesions  NECK: no adenopathy, no asymmetry, masses, or scars and thyroid normal to palpation  RESP: lungs clear to auscultation - no rales, rhonchi or wheezes  CV: regular rate and rhythm, normal S1 S2, no S3 or S4, no murmur, click or rub, no peripheral edema and peripheral pulses strong  ABDOMEN: soft, nontender, no hepatosplenomegaly, no masses and bowel sounds normal  MS: no gross musculoskeletal defects noted, no edema  SKIN: no suspicious lesions or rashes  NEURO: Normal strength and tone, mentation intact and speech normal  PSYCH: mentation appears normal, affect normal/bright    Diagnostic Test Results:  Labs reviewed in Epic    ASSESSMENT/PLAN:   (Z00.00) Routine general medical examination at a health care facility  (primary encounter diagnosis)  Comment: Reviewed medical history and health maintenance.  Plan: CBC with platelets            (I10) Essential hypertension with goal blood pressure less than 140/90  Comment:  Stable, tolerating med, no changes at this time.   Plan: Comprehensive metabolic panel, lisinopril         (ZESTRIL) 10 MG tablet, Albumin Random Urine         Quantitative with Creat Ratio            (E03.4) Hypothyroidism due to acquired atrophy of thyroid  Comment:  Stable, tolerating med, no changes at this time.  Will adjust medications accordingly pending TSH results  Plan: TSH with free T4 reflex, SYNTHROID 112MCG tablet            (Z13.6)  "CARDIOVASCULAR SCREENING; LDL GOAL LESS THAN 160  Comment:   Plan: Lipid panel reflex to direct LDL Fasting            (Z13.21) Encounter for vitamin deficiency screening  Comment:   Plan: Vitamin D Deficiency              Patient has been advised of split billing requirements and indicates understanding: Yes    COUNSELING:  Reviewed preventive health counseling, as reflected in patient instructions    Estimated body mass index is 31.27 kg/m  as calculated from the following:    Height as of 6/10/21: 1.607 m (5' 3.25\").    Weight as of 2/11/22: 80.7 kg (177 lb 14.6 oz).    Weight management plan: Discussed healthy diet and exercise guidelines    She reports that she has never smoked. She has never used smokeless tobacco.      Counseling Resources:  ATP IV Guidelines  Pooled Cohorts Equation Calculator  Breast Cancer Risk Calculator  BRCA-Related Cancer Risk Assessment: FHS-7 Tool  FRAX Risk Assessment  ICSI Preventive Guidelines  Dietary Guidelines for Americans, 2010  USDA's MyPlate  ASA Prophylaxis  Lung CA Screening    NADIA Haddad [facility]  "

## 2022-05-04 ENCOUNTER — OFFICE VISIT (OUTPATIENT)
Dept: DERMATOLOGY | Facility: CLINIC | Age: 52
End: 2022-05-04
Attending: DERMATOLOGY
Payer: COMMERCIAL

## 2022-05-04 DIAGNOSIS — Q27.9 VENOUS MALFORMATION: Primary | ICD-10-CM

## 2022-05-04 PROCEDURE — 17106 DSTR CUT VSC PRLF LES<10SQCM: CPT | Performed by: DERMATOLOGY

## 2022-05-04 PROCEDURE — G0463 HOSPITAL OUTPT CLINIC VISIT: HCPCS

## 2022-05-04 NOTE — LETTER
2022      RE: Janine Beverly  [address] [place] [address]     Dear Colleague,    Thank you for the opportunity to participate in the care of your patient, Janine Beverly, at the [facility] at [facility]. Please see a copy of my visit note below.    [facility]   Pediatric Dermatologic Laser Surgery   Procedure Note         Patient Name:              Janine Beverly  Patient :                1970  Medical Record #:       [id number]  Date of Operation:      May 4, 2022             ND YAG laser of lower lip venous malformation, fourth treatment today  Nice reduction in size after several treatments at conservative settings last month  Risk of blister formation, scarring and edema formation discussed. We are happy with the interval improvement, and ready for additional treatment today     SURGEON:  Doris Zavaleta MD     ASSISTANT:  N/A     PREOPERATIVE DIAGNOSIS:  Venous malformation     POSTOPERATIVE DIAGNOSIS:  Same     INDICATION: treatment of facial VM of the lower lip x4   PROCEDURE PERFORMED:  ND-YAG laser     PROCEDURE:   H & P reviewed prior to the procedure.  After discussion of risks and benefits of the procedure including the presence of pain, blister formation, scarring,  pigmentary changes or bruising following the procedure, written consent was obtained from the patient, and the patient was taken to the procedure room. Topical anesthesia was induced with LMX for 30 minutes.  The patient was placed in the supine position.  Appropriate eyewear in place for all in attendance.  The lesion on the lower lip was delineated by a marking pen.      SITE: lower lip   SPOT SIZE: 12 mm  FLUENCE: 38 J/cm2  PULSE DURATION: 3 ms  PULSE NUMBER: 4 pulses  COOLIN/20  TOTAL AREA TREATED: <10 sq cm.  NOTES:   Vaseline and ice pack applied after procedure and  while in recovery.      There were no complications to the procedure or abnormal findings.  Post-op care discussed including sun protection, use of analgesia.  Follow up in 4 weeks for re-treatment as desired     Doris Zavaleta MD    of Dermatology & Pediatrics   Pediatric Dermatology                                Pause for the cause has been completed prior to NdYag of lower lip.   1. Janine was identified by both name and date of birth -  YES.   2. The correct site was identified -  YES.   3. Site marked by provider - N/A.   4. Written informed consent correct and signed or verbal authorization  to proceed is obtained -  YES.   5. Verify necessary supplies, equipment, and diagnostics are available -  YES.   6. Time out is performed immediately prior to procedure -  YES.        Please do not hesitate to contact me if you have any questions/concerns.     Sincerely,       Doris Zavaleta MD

## 2022-05-04 NOTE — PROGRESS NOTES
Pause for the cause has been completed prior to NdYag of lower lip.   1. Janine was identified by both name and date of birth -  YES.   2. The correct site was identified -  YES.   3. Site marked by provider - N/A.   4. Written informed consent correct and signed or verbal authorization  to proceed is obtained -  YES.   5. Verify necessary supplies, equipment, and diagnostics are available -  YES.   6. Time out is performed immediately prior to procedure -  YES.

## 2022-05-04 NOTE — PROGRESS NOTES
[facility]   Pediatric Dermatologic Laser Surgery   Procedure Note         Patient Name:              Janine Beverly  Patient :                1970  Medical Record #:       [id number]  Date of Operation:      May 4, 2022               ND YAG laser of lower lip venous malformation, fourth treatment today  Nice reduction in size after several treatments at conservative settings last month  Risk of blister formation, scarring and edema formation discussed. We are happy with the interval improvement, and ready for additional treatment today     SURGEON:  Doris Zavaleta MD     ASSISTANT:  N/A     PREOPERATIVE DIAGNOSIS:  Venous malformation     POSTOPERATIVE DIAGNOSIS:  Same     INDICATION: treatment of facial VM of the lower lip x4   PROCEDURE PERFORMED:  ND-YAG laser     PROCEDURE:   H & P reviewed prior to the procedure.  After discussion of risks and benefits of the procedure including the presence of pain, blister formation, scarring,  pigmentary changes or bruising following the procedure, written consent was obtained from the patient, and the patient was taken to the procedure room. Topical anesthesia was induced with LMX for 30 minutes.  The patient was placed in the supine position.  Appropriate eyewear in place for all in attendance.  The lesion on the lower lip was delineated by a marking pen.      SITE: lower lip   SPOT SIZE: 12 mm  FLUENCE: 38 J/cm2  PULSE DURATION: 3 ms  PULSE NUMBER: 4 pulses  COOLIN/20  TOTAL AREA TREATED: <10 sq cm.  NOTES:   Vaseline and ice pack applied after procedure and while in recovery.      There were no complications to the procedure or abnormal findings.  Post-op care discussed including sun protection, use of analgesia.  Follow up in 4 weeks for re-treatment as desired     Doris Zavaleta MD    of Dermatology & Pediatrics   Pediatric Dermatology

## 2022-05-04 NOTE — PATIENT INSTRUCTIONS
[facility]- Pediatric Dermatology  Dr. Parul Pickard, Dr. Doris Zavaleta, Dr. Yu Vaughan, Dr. Mary Rebollar, TORIE Pollock Dr., Dr. Uma Suh    Non Urgent  Nurse Triage Line; [phone]- [facility] Care Coordinators    Amy (/Complex ) [phone]    If you need a prescription refill, please contact your pharmacy. Refills are approved or denied by our Physicians during normal business hours, Monday through Fridays  Per office policy, refills will not be granted if you have not been seen within the past year (or sooner depending on your child's condition)      Scheduling Information:   Pediatric Appointment Scheduling and Call Center [phone]   Radiology Scheduling- [phone]   Sedation Unit Scheduling- [phone]  [place] Scheduling- [facility] [phone]; Pediatric Dermatology Clinic [phone]  Main  Services: [phone]   Dutch: [phone]   Emirati: [phone]   Hmong/Mongolian/Cornel: [phone]    Preadmission Nursing Department Fax Number: [phone] (Fax all pre-operative paperwork to this number)      For urgent matters arising during evenings, weekends, or holidays that cannot wait for normal business hours please call [phone] and ask for the Dermatology Resident On-Call to be paged.

## 2022-05-16 ENCOUNTER — TELEPHONE (OUTPATIENT)
Dept: DERMATOLOGY | Facility: CLINIC | Age: 52
End: 2022-05-16
Payer: COMMERCIAL

## 2022-05-16 NOTE — TELEPHONE ENCOUNTER
[facility] Call Center    Phone Message    May a detailed message be left on voicemail: yes     Reason for Call: Other: Procedure Appointments     Action Taken: Other: Peds Derm    Travel Screening: Not Applicable     Janine was calling to reschedule 06/03 procedure appointment schedule, Call center is uncertain on rescheduling as there is a series of follow up schedule after that first visit.   Sending to be review by clinic on rescheduling 06/03 and if the other appointments will need to be reschedule as well. Please call [phone].

## 2022-05-17 ENCOUNTER — LAB (OUTPATIENT)
Dept: LAB | Facility: CLINIC | Age: 52
End: 2022-05-17
Payer: COMMERCIAL

## 2022-05-17 ENCOUNTER — MYC MEDICAL ADVICE (OUTPATIENT)
Dept: FAMILY MEDICINE | Facility: CLINIC | Age: 52
End: 2022-05-17

## 2022-05-17 DIAGNOSIS — Z13.6 CARDIOVASCULAR SCREENING; LDL GOAL LESS THAN 160: ICD-10-CM

## 2022-05-17 DIAGNOSIS — Z00.00 ROUTINE GENERAL MEDICAL EXAMINATION AT A HEALTH CARE FACILITY: ICD-10-CM

## 2022-05-17 DIAGNOSIS — I10 ESSENTIAL HYPERTENSION WITH GOAL BLOOD PRESSURE LESS THAN 140/90: ICD-10-CM

## 2022-05-17 DIAGNOSIS — Z13.21 ENCOUNTER FOR VITAMIN DEFICIENCY SCREENING: ICD-10-CM

## 2022-05-17 DIAGNOSIS — E03.4 HYPOTHYROIDISM DUE TO ACQUIRED ATROPHY OF THYROID: ICD-10-CM

## 2022-05-17 LAB
DEPRECATED CALCIDIOL+CALCIFEROL SERPL-MC: 29 UG/L (ref 20–75)
ERYTHROCYTE [DISTWIDTH] IN BLOOD BY AUTOMATED COUNT: 12 % (ref 10–15)
HCT VFR BLD AUTO: 43.8 % (ref 35–47)
HGB BLD-MCNC: 14.5 G/DL (ref 11.7–15.7)
MCH RBC QN AUTO: 28.7 PG (ref 26.5–33)
MCHC RBC AUTO-ENTMCNC: 33.1 G/DL (ref 31.5–36.5)
MCV RBC AUTO: 87 FL (ref 78–100)
PLATELET # BLD AUTO: 207 10E3/UL (ref 150–450)
RBC # BLD AUTO: 5.06 10E6/UL (ref 3.8–5.2)
WBC # BLD AUTO: 3.7 10E3/UL (ref 4–11)

## 2022-05-17 PROCEDURE — 80053 COMPREHEN METABOLIC PANEL: CPT

## 2022-05-17 PROCEDURE — 80061 LIPID PANEL: CPT

## 2022-05-17 PROCEDURE — 36415 COLL VENOUS BLD VENIPUNCTURE: CPT

## 2022-05-17 PROCEDURE — 82306 VITAMIN D 25 HYDROXY: CPT

## 2022-05-17 PROCEDURE — 84443 ASSAY THYROID STIM HORMONE: CPT

## 2022-05-17 PROCEDURE — 82043 UR ALBUMIN QUANTITATIVE: CPT

## 2022-05-17 PROCEDURE — 85027 COMPLETE CBC AUTOMATED: CPT

## 2022-05-18 LAB
ALBUMIN SERPL-MCNC: 4 G/DL (ref 3.4–5)
ALP SERPL-CCNC: 71 U/L (ref 40–150)
ALT SERPL W P-5'-P-CCNC: 25 U/L (ref 0–50)
ANION GAP SERPL CALCULATED.3IONS-SCNC: 6 MMOL/L (ref 3–14)
AST SERPL W P-5'-P-CCNC: 22 U/L (ref 0–45)
BILIRUB SERPL-MCNC: 0.3 MG/DL (ref 0.2–1.3)
BUN SERPL-MCNC: 15 MG/DL (ref 7–30)
CALCIUM SERPL-MCNC: 9.3 MG/DL (ref 8.5–10.1)
CHLORIDE BLD-SCNC: 105 MMOL/L (ref 94–109)
CHOLEST SERPL-MCNC: 241 MG/DL
CO2 SERPL-SCNC: 27 MMOL/L (ref 20–32)
CREAT SERPL-MCNC: 0.83 MG/DL (ref 0.52–1.04)
FASTING STATUS PATIENT QL REPORTED: YES
GFR SERPL CREATININE-BSD FRML MDRD: 84 ML/MIN/1.73M2
GLUCOSE BLD-MCNC: 90 MG/DL (ref 70–99)
HDLC SERPL-MCNC: 71 MG/DL
LDLC SERPL CALC-MCNC: 152 MG/DL
NONHDLC SERPL-MCNC: 170 MG/DL
POTASSIUM BLD-SCNC: 5.2 MMOL/L (ref 3.4–5.3)
PROT SERPL-MCNC: 7.3 G/DL (ref 6.8–8.8)
SODIUM SERPL-SCNC: 138 MMOL/L (ref 133–144)
TRIGL SERPL-MCNC: 90 MG/DL
TSH SERPL DL<=0.005 MIU/L-ACNC: 0.88 MU/L (ref 0.4–4)

## 2022-05-18 NOTE — TELEPHONE ENCOUNTER
As long as Janine feels that the swelling is resolved, I could do it in  Week intervals. However I believe 4-6 would be ideal  Thanks  hemant

## 2022-05-18 NOTE — TELEPHONE ENCOUNTER
Called and spoke with Janine who stated that she would like to r/s as she was very swollen after last procedure and her sons graduation party is that weekend. Patient is unable to r/s to 6/17 and has decided to wait until 7/1. Patient added to wait list.

## 2022-05-20 LAB
CREAT UR-MCNC: 249 MG/DL
MICROALBUMIN UR-MCNC: 12 MG/L
MICROALBUMIN/CREAT UR: 4.82 MG/G CR (ref 0–25)

## 2022-05-23 NOTE — RESULT ENCOUNTER NOTE
Mary Ms. Beverly,  Your results came back and are within acceptable limits. -Microalbumin (urine protein) test is normal.  ADVISE: rechecking this annually.. If you have any further concerns please do not hesitate to contact us by message, phone or making an appointment.  Have a good day   Dr Jaime MAE in PCP absence today

## 2022-06-01 ENCOUNTER — E-VISIT (OUTPATIENT)
Dept: URGENT CARE | Facility: CLINIC | Age: 52
End: 2022-06-01
Payer: COMMERCIAL

## 2022-06-01 DIAGNOSIS — B30.9 VIRAL CONJUNCTIVITIS: Primary | ICD-10-CM

## 2022-06-01 PROCEDURE — 99421 OL DIG E/M SVC 5-10 MIN: CPT | Performed by: PHYSICIAN ASSISTANT

## 2022-07-01 ENCOUNTER — OFFICE VISIT (OUTPATIENT)
Dept: DERMATOLOGY | Facility: CLINIC | Age: 52
End: 2022-07-01
Attending: DERMATOLOGY
Payer: COMMERCIAL

## 2022-07-01 DIAGNOSIS — Q27.9 VENOUS MALFORMATION: Primary | ICD-10-CM

## 2022-07-01 PROCEDURE — G0463 HOSPITAL OUTPT CLINIC VISIT: HCPCS | Mod: 25

## 2022-07-01 ASSESSMENT — PAIN SCALES - GENERAL: PAINLEVEL: NO PAIN (0)

## 2022-07-01 NOTE — PROGRESS NOTES
[facility]   Pediatric Dermatologic Laser Surgery   Procedure Note         Patient Name:              Janine Beverly  Patient :                1970  Medical Record #:       [id number]  Date of Operation:      May 4, 2022                 ND YAG laser of lower lip venous malformation, fourth treatment today  Nice reduction in size after 4 treatments. She had some moderate swelling after last months' treatment  Risk of blister formation, scarring and edema formation discussed. We are happy with the interval improvement, and ready for additional treatment today     SURGEON:  Doris Zavaleta MD     ASSISTANT:  N/A     PREOPERATIVE DIAGNOSIS:  Venous malformation     POSTOPERATIVE DIAGNOSIS:  Same     INDICATION: treatment of facial VM of the lower lip x5   PROCEDURE PERFORMED:  ND-YAG laser     PROCEDURE:   H & P reviewed prior to the procedure.  After discussion of risks and benefits of the procedure including the presence of pain, blister formation, scarring,  pigmentary changes or bruising following the procedure, written consent was obtained from the patient, and the patient was taken to the procedure room. Topical anesthesia was induced with LMX for 30 minutes.  The patient was placed in the supine position.  Appropriate eyewear in place for all in attendance.  The lesion on the lower lip was delineated by a marking pen.      SITE: lower lip   SPOT SIZE: 12 mm  FLUENCE: 38 J/cm2  PULSE DURATION: 3 ms  PULSE NUMBER: 3 pulses  COOLIN/20  TOTAL AREA TREATED: <10 sq cm.  NOTES:   Vaseline and ice pack applied after procedure and while in recovery.      There were no complications to the procedure or abnormal findings.  Post-op care discussed including sun protection, use of analgesia.  Follow up in 4 weeks for re-treatment as desired     Doris Zavaleta MD    of Dermatology & Pediatrics   Pediatric Dermatology

## 2022-07-01 NOTE — NURSING NOTE
"[facility] [[id number]]  Chief Complaint   Patient presents with     RECHECK     Ndyag lower lip     Initial There were no vitals taken for this visit. Estimated body mass index is 30.69 kg/m  as calculated from the following:    Height as of 4/25/22: 5' 3.39\" (161 cm).    Weight as of 4/25/22: 175 lb 6.4 oz (79.6 kg).  Medication Reconciliation: complete    Does the patient need any medication refills today? No     Gumaro Lockwood, EMT        "

## 2022-07-01 NOTE — PATIENT INSTRUCTIONS
[facility]- Pediatric Dermatology  Dr. Parul Pickard, Dr. Doris Zavaleta, Dr. Yu Vaughan, Dr. Mary Rebollar, TORIE Pollock Dr., Dr. Uma Suh    Non Urgent  Nurse Triage Line; [phone]- [facility]uh and [facility] Care Coordinators    Amy (/Complex ) [phone]    If you need a prescription refill, please contact your pharmacy. Refills are approved or denied by our Physicians during normal business hours, Monday through Fridays  Per office policy, refills will not be granted if you have not been seen within the past year (or sooner depending on your child's condition)      Scheduling Information:   Pediatric Appointment Scheduling and Call Center [phone]   Radiology Scheduling- [phone]   Sedation Unit Scheduling- [phone]  Main  Services: [phone]   Kiswahili: [phone]   Equatorial Guinean: [phone]   Hmong/Bermudian/Cornel: [phone]    Preadmission Nursing Department Fax Number: [phone] (Fax all pre-operative paperwork to this number)      For urgent matters arising during evenings, weekends, or holidays that cannot wait for normal business hours please call [phone] and ask for the Dermatology Resident On-Call to be paged.

## 2022-07-01 NOTE — LETTER
2022      RE: Janine Beverly  [address] [place] [address]     Dear Colleague,    Thank you for the opportunity to participate in the care of your patient, Janine Beverly, at the [facility] at [facility]. Please see a copy of my visit note below.    [facility]   Pediatric Dermatologic Laser Surgery   Procedure Note         Patient Name:              Janine Beverly  Patient :                1970  Medical Record #:       [id number]  Date of Operation:      May 4, 2022                 ND YAG laser of lower lip venous malformation, fourth treatment today  Nice reduction in size after 4 treatments. She had some moderate swelling after last months' treatment  Risk of blister formation, scarring and edema formation discussed. We are happy with the interval improvement, and ready for additional treatment today     SURGEON:  Doris Zavaleta MD     ASSISTANT:  N/A     PREOPERATIVE DIAGNOSIS:  Venous malformation     POSTOPERATIVE DIAGNOSIS:  Same     INDICATION: treatment of facial VM of the lower lip x5   PROCEDURE PERFORMED:  ND-YAG laser     PROCEDURE:   H & P reviewed prior to the procedure.  After discussion of risks and benefits of the procedure including the presence of pain, blister formation, scarring,  pigmentary changes or bruising following the procedure, written consent was obtained from the patient, and the patient was taken to the procedure room. Topical anesthesia was induced with LMX for 30 minutes.  The patient was placed in the supine position.  Appropriate eyewear in place for all in attendance.  The lesion on the lower lip was delineated by a marking pen.      SITE: lower lip   SPOT SIZE: 12 mm  FLUENCE: 38 J/cm2  PULSE DURATION: 3 ms  PULSE NUMBER: 3 pulses  COOLIN/20  TOTAL AREA TREATED: <10 sq cm.  NOTES:   Vaseline and ice pack applied  after procedure and while in recovery.      There were no complications to the procedure or abnormal findings.  Post-op care discussed including sun protection, use of analgesia.  Follow up in 4 weeks for re-treatment as desired     Doris Zavaleta MD    of Dermatology & Pediatrics   Pediatric Dermatology                                     Please do not hesitate to contact me if you have any questions/concerns.     Sincerely,       Doris Zavaleta MD

## 2022-08-05 ENCOUNTER — OFFICE VISIT (OUTPATIENT)
Dept: DERMATOLOGY | Facility: CLINIC | Age: 52
End: 2022-08-05
Attending: DERMATOLOGY
Payer: COMMERCIAL

## 2022-08-05 DIAGNOSIS — Q27.9 VENOUS MALFORMATION: Primary | ICD-10-CM

## 2022-08-05 PROCEDURE — G0463 HOSPITAL OUTPT CLINIC VISIT: HCPCS | Mod: 25

## 2022-08-05 PROCEDURE — 17106 DSTR CUT VSC PRLF LES<10SQCM: CPT | Performed by: DERMATOLOGY

## 2022-08-05 ASSESSMENT — PAIN SCALES - GENERAL: PAINLEVEL: NO PAIN (0)

## 2022-08-05 NOTE — PROGRESS NOTES
[facility]   Pediatric Dermatologic Laser Surgery   Procedure Note         Patient Name:              Janine Beverly  Patient :                1970  Medical Record #:       [id number]  Date of Operation:      2022                   ND YAG laser of lower lip venous malformation, fourth treatment today  Nice reduction in size after 5 treatments. She had some moderate swelling after last months' treatment  Risk of blister formation, scarring and edema formation discussed. We are happy with the interval improvement, and ready for additional treatment today     SURGEON:  Doris Zavaleta MD     ASSISTANT:  N/A     PREOPERATIVE DIAGNOSIS:  Venous malformation     POSTOPERATIVE DIAGNOSIS:  Same     INDICATION: treatment of facial VM of the lower lip x6  PROCEDURE PERFORMED:  ND-YAG laser     PROCEDURE:   H & P reviewed prior to the procedure.  After discussion of risks and benefits of the procedure including the presence of pain, blister formation, scarring,  pigmentary changes or bruising following the procedure, written consent was obtained from the patient, and the patient was taken to the procedure room. Topical anesthesia was induced with LMX for 30 minutes.  The patient was placed in the supine position.  Appropriate eyewear in place for all in attendance.  The lesion on the lower lip was delineated by a marking pen.      SITE: lower lip   SPOT SIZE: 12 mm  FLUENCE: 40 J/cm2  PULSE DURATION: 3 ms  PULSE NUMBER: 3 pulses  COOLIN/20  TOTAL AREA TREATED: <10 sq cm.  NOTES:   Vaseline and ice pack applied after procedure and while in recovery.      There were no complications to the procedure or abnormal findings.  Post-op care discussed including sun protection, use of analgesia.  Follow up in 4 weeks for re-treatment as desired     Doris Zavaleta MD    of Dermatology & Pediatrics   Pediatric Dermatology

## 2022-08-05 NOTE — LETTER
2022      RE: Janine Beverly  [address] [place] [address]     Dear Colleague,    Thank you for the opportunity to participate in the care of your patient, Janine Beverly, at the [facility] PEDIATRIC SPECIALTY CLINIC at [facility]. Please see a copy of my visit note below.    [facility]   Pediatric Dermatologic Laser Surgery   Procedure Note         Patient Name:              Janine Beverly  Patient :                1970  Medical Record #:       [id number]  Date of Operation:      2022                   ND YAG laser of lower lip venous malformation, fourth treatment today  Nice reduction in size after 5 treatments. She had some moderate swelling after last months' treatment  Risk of blister formation, scarring and edema formation discussed. We are happy with the interval improvement, and ready for additional treatment today     SURGEON:  Doris Zavaleta MD     ASSISTANT:  N/A     PREOPERATIVE DIAGNOSIS:  Venous malformation     POSTOPERATIVE DIAGNOSIS:  Same     INDICATION: treatment of facial VM of the lower lip x6  PROCEDURE PERFORMED:  ND-YAG laser     PROCEDURE:   H & P reviewed prior to the procedure.  After discussion of risks and benefits of the procedure including the presence of pain, blister formation, scarring,  pigmentary changes or bruising following the procedure, written consent was obtained from the patient, and the patient was taken to the procedure room. Topical anesthesia was induced with LMX for 30 minutes.  The patient was placed in the supine position.  Appropriate eyewear in place for all in attendance.  The lesion on the lower lip was delineated by a marking pen.      SITE: lower lip   SPOT SIZE: 12 mm  FLUENCE: 40 J/cm2  PULSE DURATION: 3 ms  PULSE NUMBER: 3 pulses  COOLIN/20  TOTAL AREA TREATED: <10 sq cm.  NOTES:   Vaseline and ice pack  applied after procedure and while in recovery.      There were no complications to the procedure or abnormal findings.  Post-op care discussed including sun protection, use of analgesia.  Follow up in 4 weeks for re-treatment as desired     Doris Zavaleta MD    of Dermatology & Pediatrics   Pediatric Dermatology                         Please do not hesitate to contact me if you have any questions/concerns.     Sincerely,       Doris Zavaleta MD

## 2022-08-05 NOTE — NURSING NOTE
Pause for the cause has been completed prior to NdYAG laser treatment of lip.   1. Janine was identified by both name and date of birth -  YES.   2. The correct site was identified -  YES.   3. Site marked by provider - YES.   4. Written informed consent correct and signed or verbal authorization  to proceed is obtained -  YES.   5. Verify necessary supplies, equipment, and diagnostics are available -  YES.   6. Time out is performed immediately prior to procedure -  YES.    Dressed with Vaseline and ice following procedure. After care instructions were provided and discussed.

## 2022-08-05 NOTE — NURSING NOTE
"[facility] [[id number]]  Chief Complaint   Patient presents with     RECHECK     NdYag laser treatment     Initial There were no vitals taken for this visit. Estimated body mass index is 30.69 kg/m  as calculated from the following:    Height as of 4/25/22: 5' 3.39\" (161 cm).    Weight as of 4/25/22: 175 lb 6.4 oz (79.6 kg).  Medication Reconciliation: complete    Does the patient need any medication refills today? No     Amanda Krueger, EMT        "

## 2022-08-05 NOTE — PATIENT INSTRUCTIONS
[facility]- Pediatric Dermatology  Dr. Parlu Pickard, Dr. Doris Zavaleta, Dr. Yu Vaughan, Dr. Mary Rebollar, TORIE Pollock Dr., Dr. Uma Suh    Non Urgent  Nurse Triage Line; [phone]- [facility] Care Coordinators    Amy (/Complex ) [phone]    If you need a prescription refill, please contact your pharmacy. Refills are approved or denied by our Physicians during normal business hours, Monday through Fridays  Per office policy, refills will not be granted if you have not been seen within the past year (or sooner depending on your child's condition)      Scheduling Information:   Pediatric Appointment Scheduling and Call Center [phone]   Radiology Scheduling- [phone]   Sedation Unit Scheduling- [phone]  Main  Services: [phone]   Kyrgyz: [phone]   Jamaican: [phone]   Hmong/Andorran/Cornel: [phone]    Preadmission Nursing Department Fax Number: [phone] (Fax all pre-operative paperwork to this number)      For urgent matters arising during evenings, weekends, or holidays that cannot wait for normal business hours please call [phone] and ask for the Dermatology Resident On-Call to be paged.

## 2022-08-19 ENCOUNTER — TELEPHONE (OUTPATIENT)
Dept: DERMATOLOGY | Facility: CLINIC | Age: 52
End: 2022-08-19

## 2022-08-19 NOTE — TELEPHONE ENCOUNTER
Attempted to schedule multiple procedures with patient, no answer, left message with direct number.

## 2022-09-09 ENCOUNTER — OFFICE VISIT (OUTPATIENT)
Dept: DERMATOLOGY | Facility: CLINIC | Age: 52
End: 2022-09-09
Attending: DERMATOLOGY
Payer: COMMERCIAL

## 2022-09-09 DIAGNOSIS — Q27.9 VENOUS MALFORMATION: Primary | ICD-10-CM

## 2022-09-09 PROCEDURE — 17106 DSTR CUT VSC PRLF LES<10SQCM: CPT | Performed by: DERMATOLOGY

## 2022-09-09 PROCEDURE — G0463 HOSPITAL OUTPT CLINIC VISIT: HCPCS

## 2022-09-09 NOTE — PATIENT INSTRUCTIONS
[facility]- Pediatric Dermatology  Dr. Parul Pickard, Dr. Doris Zavaleta, Dr. Yu Vaughan, Dr. Mary Rebollar, TORIE Pollock Dr., Dr. Uma Suh    Non Urgent  Nurse Triage Line; [phone]- [facility] [employer] [facility] Care Coordinators    Amy (/Complex ) [phone]    If you need a prescription refill, please contact your pharmacy. Refills are approved or denied by our Physicians during normal business hours, Monday through Fridays  Per office policy, refills will not be granted if you have not been seen within the past year (or sooner depending on your child's condition)      Scheduling Information:   Pediatric Appointment Scheduling and Call Center [phone]   Radiology Scheduling- [phone]   Sedation Unit Scheduling- [phone]  Main  Services: [phone]   Thai: [phone]   Faroese: [phone]   Hmong/Ethiopian/Cornel: [phone]    Preadmission Nursing Department Fax Number: [phone] (Fax all pre-operative paperwork to this number)      For urgent matters arising during evenings, weekends, or holidays that cannot wait for normal business hours please call [phone] and ask for the Dermatology Resident On-Call to be paged.

## 2022-09-09 NOTE — PROGRESS NOTES
[facility]   Pediatric Dermatologic Laser Surgery   Procedure Note         Patient Name:              Janine Beverly  Patient :                1970  Medical Record #:       [id number]  Date of Operation:     2022               ND YAG laser of lower lip venous malformation, Nice reduction in size after 6 treatments. She had some moderate swelling after last months' treatment  Risk of blister formation, scarring and edema formation discussed. We are happy with the interval improvement, and ready for additional treatment today     SURGEON:  Doris Zavaleta MD     ASSISTANT:  N/A     PREOPERATIVE DIAGNOSIS:  Venous malformation     POSTOPERATIVE DIAGNOSIS:  Same     INDICATION: treatment of facial VM of the lower lip x7  PROCEDURE PERFORMED:  ND-YAG laser     PROCEDURE:   H & P reviewed prior to the procedure.  After discussion of risks and benefits of the procedure including the presence of pain, blister formation, scarring,  pigmentary changes or bruising following the procedure, written consent was obtained from the patient, and the patient was taken to the procedure room. Topical anesthesia was induced with LMX for 30 minutes.  The patient was placed in the supine position.  Appropriate eyewear in place for all in attendance.  The lesion on the lower lip was delineated by a marking pen.      SITE: lower lip   SPOT SIZE: 12 mm  FLUENCE: 40 J/cm2  PULSE DURATION: 3 ms  PULSE NUMBER: 3 pulses  COOLIN/20  TOTAL AREA TREATED: <10 sq cm.  NOTES:   Vaseline and ice pack applied after procedure and while in recovery.      There were no complications to the procedure or abnormal findings.  Post-op care discussed including sun protection, use of analgesia.  Follow up in 4 weeks for re-treatment as desired     Doris Zavaleta MD    of Dermatology & Pediatrics   Pediatric Dermatology

## 2022-09-09 NOTE — NURSING NOTE
"[facility] [[id number]]  Chief Complaint   Patient presents with     Procedure     Venous Malformation.     Initial There were no vitals taken for this visit. Estimated body mass index is 30.69 kg/m  as calculated from the following:    Height as of 4/25/22: 5' 3.39\" (161 cm).    Weight as of 4/25/22: 175 lb 6.4 oz (79.6 kg).  Medication Reconciliation: complete    Does the patient need any medication refills today? No     No vitals needed.    Vishal Pathak CMA        "

## 2022-10-10 ENCOUNTER — TELEPHONE (OUTPATIENT)
Dept: DERMATOLOGY | Facility: CLINIC | Age: 52
End: 2022-10-10

## 2022-10-10 NOTE — TELEPHONE ENCOUNTER
[facility] Call Center    Phone Message    May a detailed message be left on voicemail: yes     Reason for Call: Other: Patient calls to reschedule upcoming appointments with Dr. Zavaleta.  Patient states that she attempted to reach [facility] Admin Coordinator but that she is out of office this week.  Patients fist appointment is this Friday 10/14/22, so sending TE to scheduling.    Action Taken: Message routed to:  Other: Peds Dermatology    Travel Screening: Not Applicable

## 2022-11-04 ENCOUNTER — OFFICE VISIT (OUTPATIENT)
Dept: DERMATOLOGY | Facility: CLINIC | Age: 52
End: 2022-11-04
Attending: DERMATOLOGY
Payer: COMMERCIAL

## 2022-11-04 DIAGNOSIS — Q27.9 VENOUS MALFORMATION: Primary | ICD-10-CM

## 2022-11-04 PROCEDURE — 17106 DSTR CUT VSC PRLF LES<10SQCM: CPT | Performed by: DERMATOLOGY

## 2022-11-04 NOTE — LETTER
2022      RE: Janine Beverly  [address] [place] [address]     Dear Colleague,    Thank you for the opportunity to participate in the care of your patient, Janine Beverly, at the [facility] PEDIATRIC SPECIALTY CLINIC at [facility]. Please see a copy of my visit note below.    [facility]   Pediatric Dermatologic Laser Surgery   Procedure Note         Patient Name:              Janine Beverly  Patient :                1970  Medical Record #:       [id number]  Date of Operation:     2022         ND YAG laser of lower lip venous malformation, Nice reduction in size after 7 treatments. She had some moderate swelling after last months' treatment and a small blister on the inner mucosal surface.     Risk of blister formation, scarring and edema formation discussed.     We are happy with the interval improvement, and ready for additional treatment today and will plan on 2 more treatments before the end of the year     SURGEON:  Doris Zavaleta MD     ASSISTANT:  N/A     PREOPERATIVE DIAGNOSIS:  Venous malformation     POSTOPERATIVE DIAGNOSIS:  Same     INDICATION: treatment of facial VM of the lower lip x8  PROCEDURE PERFORMED:  ND-YAG laser     PROCEDURE:   H & P reviewed prior to the procedure.  After discussion of risks and benefits of the procedure including the presence of pain, blister formation, scarring,  pigmentary changes or bruising following the procedure, written consent was obtained from the patient, and the patient was taken to the procedure room. Topical anesthesia was induced with LMX for 30 minutes.  The patient was placed in the supine position.  Appropriate eyewear in place for all in attendance.  The lesion on the lower lip was delineated by a marking pen.      SITE: lower lip   SPOT SIZE: 12 mm  FLUENCE: 40 J/cm2  PULSE DURATION: 3 ms  PULSE  NUMBER: 2 pulses  COOLIN/20  TOTAL AREA TREATED: <10 sq cm.  NOTES:   Vaseline and ice pack applied after procedure and while in recovery.   One additional pulse at 34J/cm2 was delivered to the lower lip     There were no complications to the procedure or abnormal findings.  Post-op care discussed including sun protection, use of analgesia.  Follow up in 4 weeks for re-treatment as desired     Doris Zavaleta MD    of Dermatology & Pediatrics   Pediatric Dermatology                       Please do not hesitate to contact me if you have any questions/concerns.     Sincerely,       Doris Zavaleta MD

## 2022-11-04 NOTE — NURSING NOTE
Pause for the cause has been completed prior to NdYAG on Left Lower Lip for Venous Malformation.   1. Janine was identified by both name and date of birth -  YES.   2. The correct site was identified -  YES.   3. Site marked by provider - YES.   4. Written informed consent correct and signed or verbal authorization  to proceed is obtained -  YES.   5. Verify necessary supplies, equipment, and diagnostics are available -  YES.   6. Time out is performed immediately prior to procedure -  YES.    Vishal Pathak, TAYLA

## 2022-11-04 NOTE — PROGRESS NOTES
[facility]   Pediatric Dermatologic Laser Surgery   Procedure Note         Patient Name:              Janine Beverly  Patient :                1970  Medical Record #:       [id number]  Date of Operation:     2022         ND YAG laser of lower lip venous malformation, Nice reduction in size after 7 treatments. She had some moderate swelling after last months' treatment and a small blister on the inner mucosal surface.     Risk of blister formation, scarring and edema formation discussed.     We are happy with the interval improvement, and ready for additional treatment today and will plan on 2 more treatments before the end of the year     SURGEON:  Doris Zavaleta MD     ASSISTANT:  N/A     PREOPERATIVE DIAGNOSIS:  Venous malformation     POSTOPERATIVE DIAGNOSIS:  Same     INDICATION: treatment of facial VM of the lower lip x8  PROCEDURE PERFORMED:  ND-YAG laser     PROCEDURE:   H & P reviewed prior to the procedure.  After discussion of risks and benefits of the procedure including the presence of pain, blister formation, scarring,  pigmentary changes or bruising following the procedure, written consent was obtained from the patient, and the patient was taken to the procedure room. Topical anesthesia was induced with LMX for 30 minutes.  The patient was placed in the supine position.  Appropriate eyewear in place for all in attendance.  The lesion on the lower lip was delineated by a marking pen.      SITE: lower lip   SPOT SIZE: 12 mm  FLUENCE: 40 J/cm2  PULSE DURATION: 3 ms  PULSE NUMBER: 2 pulses  COOLIN/20  TOTAL AREA TREATED: <10 sq cm.  NOTES:   Vaseline and ice pack applied after procedure and while in recovery.   One additional pulse at 34J/cm2 was delivered to the lower lip     There were no complications to the procedure or abnormal findings.  Post-op care discussed including sun protection, use of analgesia.  Follow up in 4 weeks for  re-treatment as desired     Doris Zavaleta MD    of Dermatology & Pediatrics   Pediatric Dermatology

## 2022-11-04 NOTE — PATIENT INSTRUCTIONS
[facility]- Pediatric Dermatology  Dr. Parul Pickard, Dr. Doris Zavaleta, Dr. Yu Vaughan, Dr. Mary Rebollar, TORIE Pollock Dr., Dr. Uma Suh    Non Urgent  Nurse Triage Line; [phone]- [facility] [employer] [facility] Care Coordinators    Amy (/Complex ) [phone]    If you need a prescription refill, please contact your pharmacy. Refills are approved or denied by our Physicians during normal business hours, Monday through Fridays  Per office policy, refills will not be granted if you have not been seen within the past year (or sooner depending on your child's condition)      Scheduling Information:   Pediatric Appointment Scheduling and Call Center [phone]   Radiology Scheduling- [phone]   Sedation Unit Scheduling- [phone]  Main  Services: [phone]   Kiswahili: [phone]   Ghanaian: [phone]   Hmong/Kazakh/Cornel: [phone]    Preadmission Nursing Department Fax Number: [phone] (Fax all pre-operative paperwork to this number)      For urgent matters arising during evenings, weekends, or holidays that cannot wait for normal business hours please call [phone] and ask for the Dermatology Resident On-Call to be paged.

## 2022-12-16 ENCOUNTER — OFFICE VISIT (OUTPATIENT)
Dept: DERMATOLOGY | Facility: CLINIC | Age: 52
End: 2022-12-16
Attending: DERMATOLOGY
Payer: COMMERCIAL

## 2022-12-16 DIAGNOSIS — Q27.9 VENOUS MALFORMATION: Primary | ICD-10-CM

## 2022-12-16 PROCEDURE — 17106 DSTR CUT VSC PRLF LES<10SQCM: CPT | Performed by: DERMATOLOGY

## 2022-12-16 ASSESSMENT — PAIN SCALES - GENERAL: PAINLEVEL: NO PAIN (0)

## 2022-12-16 NOTE — PATIENT INSTRUCTIONS
[facility]- Pediatric Dermatology  Dr. Parul Pickard, Dr. Doris Zavaleta, Dr. Yu Vaughan, Dr. Mary Rebollar, TORIE Pollock Dr., Dr. Uma Suh    Non Urgent  Nurse Triage Line; [phone]- Luh and Taylor [facility]GG[facility] Care Coordinators    Amy (/Complex ) [phone]    If you need a prescription refill, please contact your pharmacy. Refills are approved or denied by our Physicians during normal business hours, Monday through Fridays  Per office policy, refills will not be granted if you have not been seen within the past year (or sooner depending on your child's condition)      Scheduling Information:   Pediatric Appointment Scheduling and Call Center [phone]   Radiology Scheduling- [phone]   Sedation Unit Scheduling- [phone]  Main  Services: [phone]   Sami: [phone]   Indonesian: [phone]   Hmong/Puerto Rican/Cornel: [phone]    Preadmission Nursing Department Fax Number: [phone] (Fax all pre-operative paperwork to this number)      For urgent matters arising during evenings, weekends, or holidays that cannot wait for normal business hours please call [phone] and ask for the Dermatology Resident On-Call to be paged.           Pediatric Dermatology  [address] [address]  [phone]    After Care Instructions Following Laser Treatment:  What to expect?  You should expect bruising to the treated areas following laser treatment for the next 2-4 weeks. If any bleeding or blistering occurs in the treated area, please notify the clinic.  Each patient is different but some patients receive treatments every 4-6 weeks and others are less frequent. We cannot proceed with further laser treatments until the bruising has resolved.   Care for treated areas  Keep the treated area(s) moist with Vaseline or Aquaphor for several days following  treatment.  We strongly recommend sun avoidance after treatment. Use sunscreen (SPF 30 or greater) and wear a wide brimmed hat for any unavoidable sun exposure to treated areas on the face.   You may bathe normally and you may swim in a pool.  You may resume all normal activities following treatment.  Pain Control  If your child has any discomfort, please give Tylenol (Acetaminophen).   Please avoid Ibuprofen when possible, as it can increase bruising.   Cold compresses may be used for swelling.    Please contact our office with questions or concerns at non urgent triage voicemail line at [phone] or call [phone] and ask for the Dermatology resident on call to be paged if it is after business hours, on a weekend or holiday or you feel the matter is urgent

## 2022-12-16 NOTE — LETTER
2022      RE: Janine Beverly  [address] [place] [address]     Dear Colleague,    Thank you for the opportunity to participate in the care of your patient, Janine Beverly, at the [facility] PEDIATRIC SPECIALTY CLINIC at [facility]. Please see a copy of my visit note below.    [facility]   Pediatric Dermatologic Laser Surgery   Procedure Note         Patient Name:              Janine Beverly  Patient :                1970  Medical Record #:       [id number]  Date of Operation:     2022             ND YAG laser of lower lip venous malformation, Nice reduction in size after 8 treatments. She had significant improvement and today is her last treatment for the present time.     Risk of blister formation, scarring and edema formation discussed.         SURGEON:  Doris Zavaleta MD     ASSISTANT:  N/A     PREOPERATIVE DIAGNOSIS:  Venous malformation     POSTOPERATIVE DIAGNOSIS:  Same     INDICATION: treatment of facial VM of the lower lip x9  PROCEDURE PERFORMED:  ND-YAG laser     PROCEDURE:   H & P reviewed prior to the procedure.  After discussion of risks and benefits of the procedure including the presence of pain, blister formation, scarring,  pigmentary changes or bruising following the procedure, written consent was obtained from the patient, and the patient was taken to the procedure room. Topical anesthesia was induced with LMX for 30 minutes.  The patient was placed in the supine position.  Appropriate eyewear in place for all in attendance.  The lesion on the lower lip was delineated by a marking pen.      SITE: lower lip   SPOT SIZE: 12 mm  FLUENCE: 40 J/cm2  PULSE DURATION: 3 ms  PULSE NUMBER: 2 pulses  COOLIN/20  TOTAL AREA TREATED: <10 sq cm.  NOTES:   Vaseline and ice pack applied after procedure and while in recovery.      There were no complications to  the procedure or abnormal findings.  Post-op care discussed including sun protection, use of analgesia.  Follow up in the future as needed     Doris Zavaleta MD    of Dermatology & Pediatrics   Pediatric Dermatology                  Please do not hesitate to contact me if you have any questions/concerns.     Sincerely,       Doris Zavaleta MD

## 2022-12-16 NOTE — NURSING NOTE
"[facility] [[id number]]  Chief Complaint   Patient presents with     Procedure     NdYAG on Left Lower Lip.     Initial There were no vitals taken for this visit. Estimated body mass index is 30.69 kg/m  as calculated from the following:    Height as of 4/25/22: 5' 3.39\" (161 cm).    Weight as of 4/25/22: 175 lb 6.4 oz (79.6 kg).  Medication Reconciliation: complete    Does the patient need any medication refills today? No    Does the patient/parent need MyChart or Proxy acces today? No    Would you like a flu shot today? No    Would you like the Covid vaccine today? No     Vishal Pathak CMA        "

## 2022-12-16 NOTE — NURSING NOTE
Pause for the cause has been completed prior to ND yag laser treatment.   1. Janine was identified by both name and date of birth -  YES.   2. The correct site was identified -  YES.   3. Site marked by provider - YES.   4. Written informed consent correct and signed or verbal authorization  to proceed is obtained -  YES.   5. Verify necessary supplies, equipment, and diagnostics are available -  YES.   6. Time out is performed immediately prior to procedure -  YES.    7. Briana Schwab, RN

## 2022-12-16 NOTE — PROGRESS NOTES
[facility]   Pediatric Dermatologic Laser Surgery   Procedure Note         Patient Name:              Janine Beverly  Patient :                1970  Medical Record #:       [id number]  Date of Operation:     2022             ND YAG laser of lower lip venous malformation, Nice reduction in size after 8 treatments. She had significant improvement and today is her last treatment for the present time.     Risk of blister formation, scarring and edema formation discussed.         SURGEON:  Doris Zavaleta MD     ASSISTANT:  N/A     PREOPERATIVE DIAGNOSIS:  Venous malformation     POSTOPERATIVE DIAGNOSIS:  Same     INDICATION: treatment of facial VM of the lower lip x9  PROCEDURE PERFORMED:  ND-YAG laser     PROCEDURE:   H & P reviewed prior to the procedure.  After discussion of risks and benefits of the procedure including the presence of pain, blister formation, scarring,  pigmentary changes or bruising following the procedure, written consent was obtained from the patient, and the patient was taken to the procedure room. Topical anesthesia was induced with LMX for 30 minutes.  The patient was placed in the supine position.  Appropriate eyewear in place for all in attendance.  The lesion on the lower lip was delineated by a marking pen.      SITE: lower lip   SPOT SIZE: 12 mm  FLUENCE: 40 J/cm2  PULSE DURATION: 3 ms  PULSE NUMBER: 2 pulses  COOLIN/20  TOTAL AREA TREATED: <10 sq cm.  NOTES:   Vaseline and ice pack applied after procedure and while in recovery.      There were no complications to the procedure or abnormal findings.  Post-op care discussed including sun protection, use of analgesia.  Follow up in the future as needed     Doris Zavaleta MD    of Dermatology & Pediatrics   Pediatric Dermatology

## 2023-04-19 ENCOUNTER — ANCILLARY PROCEDURE (OUTPATIENT)
Dept: MAMMOGRAPHY | Facility: CLINIC | Age: 53
End: 2023-04-19
Attending: NURSE PRACTITIONER
Payer: COMMERCIAL

## 2023-04-19 DIAGNOSIS — Z12.31 VISIT FOR SCREENING MAMMOGRAM: ICD-10-CM

## 2023-04-19 PROCEDURE — 77063 BREAST TOMOSYNTHESIS BI: CPT | Mod: GC | Performed by: RADIOLOGY

## 2023-04-19 PROCEDURE — 77067 SCR MAMMO BI INCL CAD: CPT | Mod: GC | Performed by: RADIOLOGY

## 2023-04-26 ENCOUNTER — OFFICE VISIT (OUTPATIENT)
Dept: FAMILY MEDICINE | Facility: CLINIC | Age: 53
End: 2023-04-26
Payer: COMMERCIAL

## 2023-04-26 ENCOUNTER — LAB (OUTPATIENT)
Dept: FAMILY MEDICINE | Facility: CLINIC | Age: 53
End: 2023-04-26

## 2023-04-26 VITALS
WEIGHT: 169.6 LBS | HEIGHT: 64 IN | HEART RATE: 66 BPM | DIASTOLIC BLOOD PRESSURE: 60 MMHG | TEMPERATURE: 98 F | RESPIRATION RATE: 20 BRPM | SYSTOLIC BLOOD PRESSURE: 103 MMHG | OXYGEN SATURATION: 99 % | BODY MASS INDEX: 28.95 KG/M2

## 2023-04-26 DIAGNOSIS — I10 ESSENTIAL HYPERTENSION: ICD-10-CM

## 2023-04-26 DIAGNOSIS — Z12.11 COLON CANCER SCREENING: ICD-10-CM

## 2023-04-26 DIAGNOSIS — Z13.0 SCREENING FOR DEFICIENCY ANEMIA: ICD-10-CM

## 2023-04-26 DIAGNOSIS — Z12.4 SCREENING FOR MALIGNANT NEOPLASM OF CERVIX: ICD-10-CM

## 2023-04-26 DIAGNOSIS — Z00.00 ROUTINE ADULT HEALTH MAINTENANCE: Primary | ICD-10-CM

## 2023-04-26 DIAGNOSIS — Z23 ENCOUNTER FOR IMMUNIZATION: ICD-10-CM

## 2023-04-26 DIAGNOSIS — E03.4 HYPOTHYROIDISM DUE TO ACQUIRED ATROPHY OF THYROID: ICD-10-CM

## 2023-04-26 DIAGNOSIS — Z13.220 SCREENING CHOLESTEROL LEVEL: ICD-10-CM

## 2023-04-26 PROBLEM — Z97.5 IUD (INTRAUTERINE DEVICE) IN PLACE: Status: ACTIVE | Noted: 2023-04-26

## 2023-04-26 PROBLEM — M25.551 HIP PAIN, RIGHT: Status: RESOLVED | Noted: 2021-06-23 | Resolved: 2023-04-26

## 2023-04-26 LAB
ANION GAP SERPL CALCULATED.3IONS-SCNC: 11 MMOL/L (ref 7–15)
BUN SERPL-MCNC: 20.6 MG/DL (ref 6–20)
CALCIUM SERPL-MCNC: 9.7 MG/DL (ref 8.6–10)
CHLORIDE SERPL-SCNC: 99 MMOL/L (ref 98–107)
CHOLEST SERPL-MCNC: 250 MG/DL
CREAT SERPL-MCNC: 0.86 MG/DL (ref 0.51–0.95)
DEPRECATED HCO3 PLAS-SCNC: 26 MMOL/L (ref 22–29)
ERYTHROCYTE [DISTWIDTH] IN BLOOD BY AUTOMATED COUNT: 11.7 % (ref 10–15)
GFR SERPL CREATININE-BSD FRML MDRD: 80 ML/MIN/1.73M2
GLUCOSE SERPL-MCNC: 93 MG/DL (ref 70–99)
HCT VFR BLD AUTO: 45.2 % (ref 35–47)
HDLC SERPL-MCNC: 75 MG/DL
HGB BLD-MCNC: 15 G/DL (ref 11.7–15.7)
LDLC SERPL CALC-MCNC: 165 MG/DL
MCH RBC QN AUTO: 28.6 PG (ref 26.5–33)
MCHC RBC AUTO-ENTMCNC: 33.2 G/DL (ref 31.5–36.5)
MCV RBC AUTO: 86 FL (ref 78–100)
NONHDLC SERPL-MCNC: 175 MG/DL
PLATELET # BLD AUTO: 220 10E3/UL (ref 150–450)
POTASSIUM SERPL-SCNC: 4.6 MMOL/L (ref 3.4–5.3)
RBC # BLD AUTO: 5.25 10E6/UL (ref 3.8–5.2)
SODIUM SERPL-SCNC: 136 MMOL/L (ref 136–145)
TRIGL SERPL-MCNC: 48 MG/DL
TSH SERPL DL<=0.005 MIU/L-ACNC: 0.26 UIU/ML (ref 0.3–4.2)
WBC # BLD AUTO: 4.5 10E3/UL (ref 4–11)

## 2023-04-26 PROCEDURE — 84443 ASSAY THYROID STIM HORMONE: CPT | Performed by: PHYSICIAN ASSISTANT

## 2023-04-26 PROCEDURE — 36415 COLL VENOUS BLD VENIPUNCTURE: CPT | Performed by: PHYSICIAN ASSISTANT

## 2023-04-26 PROCEDURE — 90471 IMMUNIZATION ADMIN: CPT | Performed by: PHYSICIAN ASSISTANT

## 2023-04-26 PROCEDURE — G0145 SCR C/V CYTO,THINLAYER,RESCR: HCPCS | Performed by: PHYSICIAN ASSISTANT

## 2023-04-26 PROCEDURE — 85027 COMPLETE CBC AUTOMATED: CPT | Performed by: PHYSICIAN ASSISTANT

## 2023-04-26 PROCEDURE — 87624 HPV HI-RISK TYP POOLED RSLT: CPT | Performed by: PHYSICIAN ASSISTANT

## 2023-04-26 PROCEDURE — 80048 BASIC METABOLIC PNL TOTAL CA: CPT | Performed by: PHYSICIAN ASSISTANT

## 2023-04-26 PROCEDURE — 99396 PREV VISIT EST AGE 40-64: CPT | Mod: 25 | Performed by: PHYSICIAN ASSISTANT

## 2023-04-26 PROCEDURE — 90746 HEPB VACCINE 3 DOSE ADULT IM: CPT | Performed by: PHYSICIAN ASSISTANT

## 2023-04-26 PROCEDURE — 90715 TDAP VACCINE 7 YRS/> IM: CPT | Performed by: PHYSICIAN ASSISTANT

## 2023-04-26 PROCEDURE — 80061 LIPID PANEL: CPT | Performed by: PHYSICIAN ASSISTANT

## 2023-04-26 PROCEDURE — 90472 IMMUNIZATION ADMIN EACH ADD: CPT | Performed by: PHYSICIAN ASSISTANT

## 2023-04-26 PROCEDURE — 99214 OFFICE O/P EST MOD 30 MIN: CPT | Mod: 25 | Performed by: PHYSICIAN ASSISTANT

## 2023-04-26 RX ORDER — LISINOPRIL 5 MG/1
5 TABLET ORAL DAILY
Qty: 90 TABLET | Refills: 3 | Status: SHIPPED | OUTPATIENT
Start: 2023-04-26 | End: 2024-05-10

## 2023-04-26 RX ORDER — LEVOTHYROXINE SODIUM 112 MCG
112 TABLET ORAL DAILY
Qty: 90 TABLET | Refills: 3 | Status: SHIPPED | OUTPATIENT
Start: 2023-04-26 | End: 2024-05-14

## 2023-04-26 ASSESSMENT — ENCOUNTER SYMPTOMS
ARTHRALGIAS: 0
MYALGIAS: 1
ABDOMINAL PAIN: 0
FREQUENCY: 0
PARESTHESIAS: 0
HEMATOCHEZIA: 0
COUGH: 0
JOINT SWELLING: 0
NAUSEA: 0
HEADACHES: 0
SHORTNESS OF BREATH: 0
PALPITATIONS: 0
NERVOUS/ANXIOUS: 0
CHILLS: 0
FEVER: 0
HEMATURIA: 0
DIARRHEA: 0
BREAST MASS: 0
CONSTIPATION: 0
HEARTBURN: 0
SORE THROAT: 0
DIZZINESS: 0
DYSURIA: 0
EYE PAIN: 0
WEAKNESS: 0

## 2023-04-26 ASSESSMENT — PAIN SCALES - GENERAL: PAINLEVEL: NO PAIN (0)

## 2023-04-26 NOTE — PROGRESS NOTES
SUBJECTIVE:   CC: Jannie is an 52 year old who presents for preventive health visit.       4/26/2023     9:09 AM   Additional Questions   Roomed by Lorena Chahal   Accompanied by alone         4/26/2023     9:09 AM   Patient Reported Additional Medications   Patient reports taking the following new medications none   Patient has been advised of split billing requirements and indicates understanding: Yes  Healthy Habits:     Getting at least 3 servings of Calcium per day:  NO    Bi-annual eye exam:  Yes    Dental care twice a year:  Yes    Sleep apnea or symptoms of sleep apnea:  None    Diet:  Regular (no restrictions)    Frequency of exercise:  6-7 days/week    Duration of exercise:  45-60 minutes    Taking medications regularly:  Yes    Medication side effects:  None    PHQ-2 Total Score: 0    Additional concerns today:  No    Janine here today for RHM  No other concerns    Due for med refills - lisinopril and levothyroxine    Has mirena, due for removal in October  Some menopausal symptoms - hot flashes, night sweats - overall tolerable  Mother went through menopause late (passed at age 55 from leiomyosarcoma, not menopausal yet) and unsure of older sister    Today's PHQ-2 Score:       4/26/2023     8:35 AM   PHQ-2 ( 1999 [employer])   Q1: Little interest or pleasure in doing things 0   Q2: Feeling down, depressed or hopeless 0   PHQ-2 Score 0   Q1: Little interest or pleasure in doing things Not at all   Q2: Feeling down, depressed or hopeless Not at all   PHQ-2 Score 0     Social History     Tobacco Use     Smoking status: Never     Smokeless tobacco: Never   Vaping Use     Vaping status: Never Used   Substance Use Topics     Alcohol use: Yes           4/26/2023     8:34 AM   Alcohol Use   Prescreen: >3 drinks/day or >7 drinks/week? No     Reviewed orders with patient.  Reviewed health maintenance and updated orders accordingly - Yes    Breast Cancer Screening:    FHS-7:       4/13/2022     9:10 AM 4/25/2022     8:15 AM  4/19/2023    12:15 PM 4/26/2023     8:36 AM   Breast CA Risk Assessment (FHS-7)   Did any of your first-degree relatives have breast or ovarian cancer? No No No No   Did any of your relatives have bilateral breast cancer? No Unknown No Unknown   Did any man in your family have breast cancer? No No No No   Did any woman in your family have breast and ovarian cancer? No Yes No No   Did any woman in your family have breast cancer before age 50 y? No Unknown No No   Do you have 2 or more relatives with breast and/or ovarian cancer? No No No No   Do you have 2 or more relatives with breast and/or bowel cancer? No No No No     Mammogram Screening: Recommended annual mammography  Pertinent mammograms are reviewed under the imaging tab.    History of abnormal Pap smear: NO - age 30-65 PAP every 5 years with negative HPV co-testing recommended     Reviewed and updated as needed this visit by clinical staff   Tobacco  Allergies  Meds  Problems  Med Hx  Surg Hx  Fam Hx          Reviewed and updated as needed this visit by Provider   Tobacco    Problems  Med Hx  Surg Hx  Fam Hx           Review of Systems   Constitutional: Negative for chills and fever.   HENT: Negative for congestion, ear pain, hearing loss and sore throat.    Eyes: Negative for pain and visual disturbance.   Respiratory: Negative for cough and shortness of breath.    Cardiovascular: Negative for chest pain, palpitations and peripheral edema.   Gastrointestinal: Negative for abdominal pain, constipation, diarrhea, heartburn, hematochezia and nausea.   Breasts:  Negative for tenderness, breast mass and discharge.   Genitourinary: Negative for dysuria, frequency, genital sores, hematuria, pelvic pain, urgency, vaginal bleeding and vaginal discharge.   Musculoskeletal: Positive for myalgias. Negative for arthralgias and joint swelling.   Skin: Negative for rash.   Neurological: Negative for dizziness, weakness, headaches and paresthesias.  "  Psychiatric/Behavioral: Negative for mood changes. The patient is not nervous/anxious.         OBJECTIVE:   /60 (BP Location: Right arm, Patient Position: Sitting, Cuff Size: Adult Regular)   Pulse 66   Temp 98  F (36.7  C) (Temporal)   Resp 20   Ht 1.615 m (5' 3.58\")   Wt 76.9 kg (169 lb 9.6 oz)   SpO2 99%   BMI 29.49 kg/m    Physical Exam  GENERAL: healthy, alert and no distress  EYES: Eyes grossly normal to inspection, PERRL and conjunctivae and sclerae normal  HENT: ear canals and TM's normal, nose and mouth without ulcers or lesions  NECK: no adenopathy, no asymmetry, masses, or scars and thyroid normal to palpation  RESP: lungs clear to auscultation - no rales, rhonchi or wheezes  BREAST: normal without masses, tenderness or nipple discharge and no palpable axillary masses or adenopathy  CV: regular rate and rhythm, normal S1 S2, no S3 or S4, no murmur, click or rub, no peripheral edema and peripheral pulses strong  ABDOMEN: soft, nontender, no hepatosplenomegaly, no masses and bowel sounds normal   (female): External genitalia normal, urethra normal, vagina normal, cervix normal without lesions. Os stenotic, IUD strings present. Uterus, ovaries, bladder, adnexa normal per bimanual exam. Pap collected.  MS: no gross musculoskeletal defects noted, no edema  SKIN: no suspicious lesions or rashes  NEURO: Normal strength and tone, mentation intact and speech normal  PSYCH: mentation appears normal, affect normal/bright      ASSESSMENT/PLAN:   Janine was seen today for physical and establish care.    Diagnoses and all orders for this visit:    Routine adult health maintenance  -     REVIEW OF HEALTH MAINTENANCE PROTOCOL ORDERS    Hypothyroidism due to acquired atrophy of thyroid - monitoring labs due, refills sent. Continue to follow up yearly  -     TSH; Future  -     SYNTHROID 112 MCG tablet; Take 1 tablet (112 mcg) by mouth daily  -     TSH    Essential hypertension - very well controlled, some " hypotensive symptoms intermittently. Will decrease dose to 5mg, refills sent & monitoring labs due.  -     Basic metabolic panel; Future  -     lisinopril (ZESTRIL) 5 MG tablet; Take 1 tablet (5 mg) by mouth daily  -     Basic metabolic panel    Screening for deficiency anemia  -     CBC with platelets; Future  -     CBC with platelets    Screening cholesterol level  -     Cancel: Lipid panel reflex to direct LDL Non-fasting; Future  -     Lipid panel reflex to direct LDL Fasting; Future  -     Lipid panel reflex to direct LDL Fasting    Screening for malignant neoplasm of cervix  -     Pap screen with HPV - recommended age 30 - 65 years    Colon cancer screening  -     COLOGUARD([employer]); Future    Encounter for immunization  -     HEPATITIS B VACCINE ADULT 3 DOSE IM (ENGERIX-B/RECOMBIVAX HB)  -     TDAP VACCINE (Adacel, Boostrix)    COUNSELING:  Reviewed preventive health counseling, as reflected in patient instructions    She reports that she has never smoked. She has never used smokeless tobacco.      Key Maria PA-C  [facility]

## 2023-04-28 LAB
BKR LAB AP GYN ADEQUACY: NORMAL
BKR LAB AP GYN INTERPRETATION: NORMAL
BKR LAB AP HPV REFLEX: NORMAL
BKR LAB AP PREVIOUS ABNORMAL: NORMAL
PATH REPORT.COMMENTS IMP SPEC: NORMAL
PATH REPORT.COMMENTS IMP SPEC: NORMAL
PATH REPORT.RELEVANT HX SPEC: NORMAL

## 2023-05-02 LAB
HUMAN PAPILLOMA VIRUS 16 DNA: NEGATIVE
HUMAN PAPILLOMA VIRUS 18 DNA: NEGATIVE
HUMAN PAPILLOMA VIRUS FINAL DIAGNOSIS: NORMAL
HUMAN PAPILLOMA VIRUS OTHER HR: NEGATIVE

## 2023-05-15 LAB — NONINV COLON CA DNA+OCC BLD SCRN STL QL: NEGATIVE

## 2023-05-30 ENCOUNTER — LAB (OUTPATIENT)
Dept: LAB | Facility: CLINIC | Age: 53
End: 2023-05-30
Payer: COMMERCIAL

## 2023-05-30 ENCOUNTER — TELEPHONE (OUTPATIENT)
Dept: FAMILY MEDICINE | Facility: CLINIC | Age: 53
End: 2023-05-30

## 2023-05-30 DIAGNOSIS — E03.4 HYPOTHYROIDISM DUE TO ACQUIRED ATROPHY OF THYROID: Primary | ICD-10-CM

## 2023-05-30 DIAGNOSIS — E03.4 HYPOTHYROIDISM DUE TO ACQUIRED ATROPHY OF THYROID: ICD-10-CM

## 2023-05-30 LAB — TSH SERPL DL<=0.005 MIU/L-ACNC: 0.16 UIU/ML (ref 0.3–4.2)

## 2023-05-30 PROCEDURE — 84443 ASSAY THYROID STIM HORMONE: CPT

## 2023-05-30 PROCEDURE — 36415 COLL VENOUS BLD VENIPUNCTURE: CPT

## 2023-05-30 RX ORDER — LEVOTHYROXINE SODIUM 100 UG/1
100 TABLET ORAL DAILY
Qty: 90 TABLET | Refills: 3 | Status: SHIPPED | OUTPATIENT
Start: 2023-05-30 | End: 2024-05-17

## 2023-05-30 NOTE — LETTER
2022      RE: Janine Beverly  [address] [place] [address]     Dear Colleague,    Thank you for the opportunity to participate in the care of your patient, Janine Beverly, at the [facility] at [facility]. Please see a copy of my visit note below.    [facility]   Pediatric Dermatologic Laser Surgery   Procedure Note         Patient Name:              Janine Beverly  Patient :                1970  Medical Record #:       [id number]  Date of Operation:     2022               ND YAG laser of lower lip venous malformation, Nice reduction in size after 6 treatments. She had some moderate swelling after last months' treatment  Risk of blister formation, scarring and edema formation discussed. We are happy with the interval improvement, and ready for additional treatment today     SURGEON:  Doris Zavaleta MD     ASSISTANT:  N/A     PREOPERATIVE DIAGNOSIS:  Venous malformation     POSTOPERATIVE DIAGNOSIS:  Same     INDICATION: treatment of facial VM of the lower lip x7  PROCEDURE PERFORMED:  ND-YAG laser     PROCEDURE:   H & P reviewed prior to the procedure.  After discussion of risks and benefits of the procedure including the presence of pain, blister formation, scarring,  pigmentary changes or bruising following the procedure, written consent was obtained from the patient, and the patient was taken to the procedure room. Topical anesthesia was induced with LMX for 30 minutes.  The patient was placed in the supine position.  Appropriate eyewear in place for all in attendance.  The lesion on the lower lip was delineated by a marking pen.      SITE: lower lip   SPOT SIZE: 12 mm  FLUENCE: 40 J/cm2  PULSE DURATION: 3 ms  PULSE NUMBER: 3 pulses  COOLIN/20  TOTAL AREA TREATED: <10 sq cm.  NOTES:   Vaseline and ice pack applied after procedure and  while in recovery.      There were no complications to the procedure or abnormal findings.  Post-op care discussed including sun protection, use of analgesia.  Follow up in 4 weeks for re-treatment as desired     Doris Zavaleta MD    of Dermatology & Pediatrics   Pediatric Dermatology                      Please do not hesitate to contact me if you have any questions/concerns.     Sincerely,       Doris Zavaleta MD     [facility]

## 2023-05-30 NOTE — TELEPHONE ENCOUNTER
lisinopril (ZESTRIL) 10 MG tablet (Discontinued)      Last Written Prescription Date:  3-24-22  Last Fill Quantity: 90 tab,   # refills: 0  Last Office Visit: 4-26-23  Future Office visit:       Routing refill request to provider for review/approval because:  Drug not active on patient's medication list

## 2023-05-31 NOTE — TELEPHONE ENCOUNTER
Clarification needed as patient's 4/26/23 visit notes dose changed to 5 mg daily.    Writer called patient who stated she is taking Lisinopril 5 mg daily and no longer taking Lisinopril 10 mg daily.    Informed patient writer will disregard refill request.    Patient verbalized understanding and in agreement with plan.    ROYCE Yañez, RN-[facility]

## 2023-07-19 ENCOUNTER — LAB (OUTPATIENT)
Dept: LAB | Facility: CLINIC | Age: 53
End: 2023-07-19
Payer: COMMERCIAL

## 2023-07-19 DIAGNOSIS — E03.4 HYPOTHYROIDISM DUE TO ACQUIRED ATROPHY OF THYROID: ICD-10-CM

## 2023-07-19 DIAGNOSIS — E03.4 HYPOTHYROIDISM DUE TO ACQUIRED ATROPHY OF THYROID: Primary | ICD-10-CM

## 2023-07-19 LAB — TSH SERPL DL<=0.005 MIU/L-ACNC: 1.01 UIU/ML (ref 0.3–4.2)

## 2023-07-19 PROCEDURE — 36415 COLL VENOUS BLD VENIPUNCTURE: CPT

## 2023-07-19 PROCEDURE — 84443 ASSAY THYROID STIM HORMONE: CPT

## 2023-08-07 ENCOUNTER — MYC MEDICAL ADVICE (OUTPATIENT)
Dept: FAMILY MEDICINE | Facility: CLINIC | Age: 53
End: 2023-08-07
Payer: COMMERCIAL

## 2023-08-07 DIAGNOSIS — Z12.83 ENCOUNTER FOR SCREENING FOR MALIGNANT NEOPLASM OF SKIN: Primary | ICD-10-CM

## 2023-08-29 NOTE — PATIENT INSTRUCTIONS

## 2023-10-09 ENCOUNTER — TRANSFERRED RECORDS (OUTPATIENT)
Dept: HEALTH INFORMATION MANAGEMENT | Facility: CLINIC | Age: 53
End: 2023-10-09
Payer: COMMERCIAL

## 2023-11-22 ENCOUNTER — OFFICE VISIT (OUTPATIENT)
Dept: FAMILY MEDICINE | Facility: CLINIC | Age: 53
End: 2023-11-22
Payer: COMMERCIAL

## 2023-11-22 VITALS
RESPIRATION RATE: 16 BRPM | TEMPERATURE: 97.9 F | HEIGHT: 64 IN | OXYGEN SATURATION: 98 % | DIASTOLIC BLOOD PRESSURE: 80 MMHG | WEIGHT: 173.9 LBS | BODY MASS INDEX: 29.69 KG/M2 | HEART RATE: 80 BPM | SYSTOLIC BLOOD PRESSURE: 116 MMHG

## 2023-11-22 DIAGNOSIS — Z97.5 IUD (INTRAUTERINE DEVICE) IN PLACE: Primary | ICD-10-CM

## 2023-11-22 PROCEDURE — 99207 PR NO CHARGE LOS: CPT | Performed by: NURSE PRACTITIONER

## 2023-11-22 ASSESSMENT — PAIN SCALES - GENERAL: PAINLEVEL: NO PAIN (0)

## 2023-11-22 NOTE — PROGRESS NOTES
"  Assessment & Plan     (Z97.5) IUD (intrauterine device) in place  (primary encounter diagnosis)  Comment:   Plan: Discussed that she is not due until 10/24 for removal, she is choosing to wait instead of getting it removed today.  Appointment will be canceled.               BMI:   Estimated body mass index is 30.32 kg/m  as calculated from the following:    Height as of this encounter: 1.613 m (5' 3.5\").    Weight as of this encounter: 78.9 kg (173 lb 14.4 oz).           Xena Dash NP  [facility]    Jeana Mehta is a 53 year old, presenting for the following health issues:  Iud removal      11/22/2023     3:22 PM   Additional Questions   Roomed by Peyton RUEDA   Accompanied by self       HPI   She is here for IUD removal, as she thought she was due.  She has a Mirena that was inserted 10/17, denies any side effects.             Review of Systems         Objective    /80 (BP Location: Right arm, Patient Position: Sitting, Cuff Size: Adult Regular)   Pulse 80   Temp 97.9  F (36.6  C) (Temporal)   Resp 16   Ht 1.613 m (5' 3.5\")   Wt 78.9 kg (173 lb 14.4 oz)   LMP  (LMP Unknown)   SpO2 98%   BMI 30.32 kg/m    Body mass index is 30.32 kg/m .  Physical Exam                         "

## 2024-02-23 ENCOUNTER — THERAPY VISIT (OUTPATIENT)
Dept: PHYSICAL THERAPY | Facility: CLINIC | Age: 54
End: 2024-02-23
Payer: COMMERCIAL

## 2024-02-23 DIAGNOSIS — M25.512 CHRONIC LEFT SHOULDER PAIN: Primary | ICD-10-CM

## 2024-02-23 DIAGNOSIS — G89.29 CHRONIC LEFT SHOULDER PAIN: Primary | ICD-10-CM

## 2024-02-23 PROCEDURE — 97110 THERAPEUTIC EXERCISES: CPT | Mod: GP

## 2024-02-23 PROCEDURE — 97161 PT EVAL LOW COMPLEX 20 MIN: CPT | Mod: GP

## 2024-02-23 NOTE — PROGRESS NOTES
PHYSICAL THERAPY EVALUATION  Type of Visit: Evaluation    See electronic medical record for Abuse and Falls Screening details.    Subjective  Patient presents with left shoulder pain. It has been going on for years, in the last year has been more bothersome. Pt swims 3x/week and a strength training class 2x/week. Generally swimming is ok, but sometimes butterfly and backstroke can be bothersome. Pain into UT area, lateral shoulder, into upper arm. Reports an [place] about [place] did some treatment at the time and it improved. Pt is RHD. No R shoulder issues.        Presenting condition or subjective complaint:    Date of onset:      Relevant medical history: Menopause; Overweight; High blood pressure; Thyroid problems   Dates & types of surgery:      Prior diagnostic imaging/testing results:       Prior therapy history for the same diagnosis, illness or injury:        Prior Level of Function  Transfers: Independent  Ambulation: Independent  ADL: Independent  IADL:  Independent    Living Environment  Social support: With a significant other or spouse   Type of home: House   Stairs to enter the home:         Ramp:     Stairs inside the home:         Help at home: None  Equipment owned:       Employment: Yes   Hobbies/Interests: swimming, reading, cooking    Patient goals for therapy:  sleeping on L side, less pain overall.    Pain assessment: See objective evaluation for additional pain details     Objective     SHOULDER EVALUATION  KEY FINDINGS:  Full elevation ROM w/ painful arc on L, mild loss L ER  General TTP L shoulder musculature  Strong and painful MMT on L    PAIN: Pain Level at Rest: 0/10  Pain Level with Use: 9/10  Pain Location:  see subj above  Pain Quality: Aching and denies N/T.   Other symptoms: Weakness  Pain Frequency: intermittent  Time Dependent?: no  Pain is Exacerbated By: sleeping on L side, carrying bags on that shoulder, certain exercises, pulling things OH,  reaching behind  Pain is Relieved By: stretching over foam roller in snow karyn position  Pain Progression: Worsened  INTEGUMENTARY (edema, incisions):   POSTURE:  mild forward shoulder  GAIT:   Weightbearing Status:   Assistive Device(s):   Gait Deviations:   BALANCE/PROPRIOCEPTION:   WEIGHTBEARING ALIGNMENT:   ROM:   (Degrees) Left AROM Left PROM Right AROM  Right PROM   Shoulder   Flexion 180* mild  180    Shoulder Extension 65  65    Shoulder Abduction 180* painful arc  180    Shoulder Adduction       Shoulder IR with extension L1  T7    Shoulder ER in neutral 53  70    Shoulder Horizontal Abduction       Shoulder Horizontal Adduction       Shoulder ER at 90 deg ABD       Shoulder IR at 90 deg ABD       Elbow Extension       Elbow Flexion       Pain:   End feel:     STRENGTH:   Pain: - none + mild ++ moderate +++ severe  Strength Scale: 0-5/5 Left Right   Shoulder Flexion 5* 5   Shoulder Extension     Shoulder Abduction 5- 5   Shoulder Adduction     Shoulder Internal Rotation 5* 5   Shoulder External Rotation 5* 5   Shoulder Horizontal Abduction     Shoulder Horizontal Adduction     Elbow Flexion 5 5   Elbow Extension 5* 5   Mid Trap 5* 5   Lower Trap 4* 5-   Rhomboid     Serratus Anterior       FLEXIBILITY:   SPECIAL TESTS:  +HK  PALPATION:  General TTP L shoulder musculature  JOINT MOBILITY:   CERVICAL SCREEN: ROM WNL, Negative Spurlings for radicular pain      Assessment & Plan   CLINICAL IMPRESSIONS  Medical Diagnosis: L shoulder pain    Treatment Diagnosis: L shoulder pain   Impression/Assessment: Patient is a 53 year old female with L shoulder complaints.  The following significant findings have been identified: Pain, Decreased ROM/flexibility, Decreased joint mobility, Decreased strength, Impaired muscle performance, and Decreased activity tolerance. These impairments interfere with their ability to perform self care tasks, work tasks, recreational activities, and household chores as compared to previous  level of function.     Clinical Decision Making (Complexity):  Clinical Presentation: Stable/Uncomplicated  Clinical Presentation Rationale: based on medical and personal factors listed in PT evaluation  Clinical Decision Making (Complexity): Low complexity    PLAN OF CARE  Treatment Interventions:  Interventions: Manual Therapy, Neuromuscular Re-education, Therapeutic Activity, Therapeutic Exercise, Self-Care/Home Management    Long Term Goals     PT Goal 1  Goal Identifier: Sleeping  Goal Description: Patient will be able to sleep on affected side w/o pain  Rationale: to maximize safety and independence with performance of ADLs and functional tasks;to maximize safety and independence with self cares (to establish restorative sleep pattern)  Target Date: 04/19/24  PT Goal 2  Goal Identifier: Exercise  Goal Description: Patient will be able to fully participate in exercise program and swimming w/o increased pain  Rationale: to maximize safety and independence with performance of ADLs and functional tasks;to maximize safety and independence within the home;to maximize safety and independence within the community  Target Date: 04/19/24      Frequency of Treatment: 1x/week  Duration of Treatment: 8 weeks    Recommended Referrals to Other Professionals:  none  Education Assessment:   Learner/Method: Patient;Pictures/Video;No Barriers to Learning    Risks and benefits of evaluation/treatment have been explained.   Patient/Family/caregiver agrees with Plan of Care.     Evaluation Time:     PT Eval, Low Complexity Minutes (96793): 20       Signing Clinician: Scarlett Hampton PT

## 2024-03-11 ENCOUNTER — THERAPY VISIT (OUTPATIENT)
Dept: PHYSICAL THERAPY | Facility: CLINIC | Age: 54
End: 2024-03-11
Payer: COMMERCIAL

## 2024-03-11 DIAGNOSIS — M25.512 CHRONIC LEFT SHOULDER PAIN: Primary | ICD-10-CM

## 2024-03-11 DIAGNOSIS — G89.29 CHRONIC LEFT SHOULDER PAIN: Primary | ICD-10-CM

## 2024-03-11 PROCEDURE — 97110 THERAPEUTIC EXERCISES: CPT | Mod: GP

## 2024-03-29 ENCOUNTER — THERAPY VISIT (OUTPATIENT)
Dept: PHYSICAL THERAPY | Facility: CLINIC | Age: 54
End: 2024-03-29
Payer: COMMERCIAL

## 2024-03-29 DIAGNOSIS — M25.512 CHRONIC LEFT SHOULDER PAIN: Primary | ICD-10-CM

## 2024-03-29 DIAGNOSIS — G89.29 CHRONIC LEFT SHOULDER PAIN: Primary | ICD-10-CM

## 2024-03-29 PROCEDURE — 97110 THERAPEUTIC EXERCISES: CPT | Mod: GP

## 2024-04-12 ENCOUNTER — THERAPY VISIT (OUTPATIENT)
Dept: PHYSICAL THERAPY | Facility: CLINIC | Age: 54
End: 2024-04-12
Payer: COMMERCIAL

## 2024-04-12 DIAGNOSIS — G89.29 CHRONIC LEFT SHOULDER PAIN: Primary | ICD-10-CM

## 2024-04-12 DIAGNOSIS — M25.512 CHRONIC LEFT SHOULDER PAIN: Primary | ICD-10-CM

## 2024-04-12 PROCEDURE — 97110 THERAPEUTIC EXERCISES: CPT | Mod: GP

## 2024-04-12 NOTE — PROGRESS NOTES
04/12/24 0500   Appointment Info   Signing clinician's name / credentials Scarlett Hampton DPT   Total/Authorized Visits E&T 8 POC   Visits Used 4   Medical Diagnosis L shoulder pain   PT Tx Diagnosis L shoulder pain   Progress Note/Certification   Therapy Frequency 1x/week   Predicted Duration 8 weeks   Progress Note Due Date 04/23/24   Progress Note Completed Date 02/23/24   GOALS   PT Goals 2   PT Goal 1   Goal Identifier Sleeping   Goal Description Patient will be able to sleep on affected side w/o pain   Rationale to maximize safety and independence with performance of ADLs and functional tasks;to maximize safety and independence with self cares  (to establish restorative sleep pattern)   Goal Progress improving, not 100% of the time yet   Target Date 04/19/24   PT Goal 2   Goal Identifier Exercise   Goal Description Patient will be able to fully participate in exercise program and swimming w/o increased pain   Rationale to maximize safety and independence with performance of ADLs and functional tasks;to maximize safety and independence within the home;to maximize safety and independence within the community   Goal Progress improving, will be sore during, not generally after   Target Date 04/19/24   Subjective Report   Subjective Report Shoulder better than when she started, but will still get sore when she exercises.   Objective Measures   Objective Measures Objective Measure 2;Objective Measure 1   Objective Measure 1   Objective Measure shoulder ROM   Details Flexion WNL and pain free, ABD WNL w/ painful arc though less so than prior, ER 67 (53 at eval)   Treatment Interventions (PT)   Interventions Therapeutic Procedure/Exercise   Therapeutic Procedure/Exercise   Therapeutic Procedures: strength, endurance, ROM, flexibility minutes (11567) 25   Ther Proc 1 Edu on adjusting strengthening and swim classes and how to do this, when to decide if its needed.   PTRx Ther Proc 1 Thoracic Extension   PTRx Ther  Proc 1 - Details VR continue HEP for mobility   PTRx Ther Proc 2 Spinal extensions   PTRx Ther Proc 2 - Details VR continue HEP for mobility   PTRx Ther Proc 3 Shoulder Theraband External Rotation   PTRx Ther Proc 3 - Details VR continue for strengthening   PTRx Ther Proc 4 Shoulder Theraband Internal Rotation   PTRx Ther Proc 4 - Details VR continue for strengthening   PTRx Ther Proc 5 Shoulder Scaption Full Can   PTRx Ther Proc 5 - Details VR continue for strengthening   PTRx Ther Proc 6 Push-Up Plus At Counter   PTRx Ther Proc 6 - Details 2x15 needing reinstruction to get good scap mobility   PTRx Ther Proc 7 Standing Theraband W's   PTRx Ther Proc 7 - Details 1x10  blue TB vc and tc for form   PTRx Ther Proc 8 Four Corner Stretch External Rotation at Side   PTRx Ther Proc 8 - Details reviewed patient had forgotten this exercise   PTRx Ther Proc 9 Sidelying Cross Body Shoulder Stretch with Internal Rotation   PTRx Ther Proc 9 - Details trialed crossbody in sitting and sleeper stretch SL cross body felt best and added to HEP   Skilled Intervention Education on purpose/benefit of HEP based on patient history and exam findings. Patient should not push into pain with exercises, education on how to adjust program based on how it feels and goals of strengthening/ROM for increased function and pain relief. Cues for proper positioning.   Manual Therapy   Manual Therapy 1 MFR/TPR   Manual Therapy 1 - Details not today   Education   Learner/Method Patient;Pictures/Video;No Barriers to Learning   Plan   Home program printed   Updates to plan of care cross body stretch   Plan for next session discharge?   Total Session Time   Timed Code Treatment Minutes 25   Total Treatment Time (sum of timed and untimed services) 25         PLAN  Continue therapy per current plan of care. Today may be last visit, leaving chart open if she needs further assistance in next 1-2 months.    Beginning/End Dates of Progress Note Reporting  Period:  02/23/24 to 04/12/2024    Referring Provider:  Referred Self

## 2024-05-01 ENCOUNTER — ANCILLARY PROCEDURE (OUTPATIENT)
Dept: MAMMOGRAPHY | Facility: CLINIC | Age: 54
End: 2024-05-01
Attending: NURSE PRACTITIONER
Payer: COMMERCIAL

## 2024-05-01 DIAGNOSIS — Z12.31 VISIT FOR SCREENING MAMMOGRAM: ICD-10-CM

## 2024-05-01 PROCEDURE — 77063 BREAST TOMOSYNTHESIS BI: CPT | Performed by: STUDENT IN AN ORGANIZED HEALTH CARE EDUCATION/TRAINING PROGRAM

## 2024-05-01 PROCEDURE — 77067 SCR MAMMO BI INCL CAD: CPT | Performed by: STUDENT IN AN ORGANIZED HEALTH CARE EDUCATION/TRAINING PROGRAM

## 2024-05-10 DIAGNOSIS — I10 ESSENTIAL HYPERTENSION: ICD-10-CM

## 2024-05-10 RX ORDER — LISINOPRIL 5 MG/1
5 TABLET ORAL DAILY
Qty: 90 TABLET | Refills: 3 | OUTPATIENT
Start: 2024-05-10

## 2024-05-10 RX ORDER — LISINOPRIL 5 MG/1
5 TABLET ORAL DAILY
Qty: 90 TABLET | Refills: 0 | Status: SHIPPED | OUTPATIENT
Start: 2024-05-10 | End: 2024-06-21

## 2024-05-10 NOTE — TELEPHONE ENCOUNTER
Rx request refused as patient needs appointment.    Reception: Please call and schedule patient.    RNs: Okay to fill maria dolores refill once scheduled.    Thank you,  SJ

## 2024-05-14 DIAGNOSIS — E03.4 HYPOTHYROIDISM DUE TO ACQUIRED ATROPHY OF THYROID: ICD-10-CM

## 2024-05-14 RX ORDER — LEVOTHYROXINE SODIUM 112 MCG
112 TABLET ORAL DAILY
Qty: 30 TABLET | Refills: 0 | Status: SHIPPED | OUTPATIENT
Start: 2024-05-14 | End: 2024-06-21

## 2024-06-20 SDOH — HEALTH STABILITY: PHYSICAL HEALTH: ON AVERAGE, HOW MANY DAYS PER WEEK DO YOU ENGAGE IN MODERATE TO STRENUOUS EXERCISE (LIKE A BRISK WALK)?: 7 DAYS

## 2024-06-20 SDOH — HEALTH STABILITY: PHYSICAL HEALTH: ON AVERAGE, HOW MANY MINUTES DO YOU ENGAGE IN EXERCISE AT THIS LEVEL?: 60 MIN

## 2024-06-20 ASSESSMENT — SOCIAL DETERMINANTS OF HEALTH (SDOH): HOW OFTEN DO YOU GET TOGETHER WITH FRIENDS OR RELATIVES?: MORE THAN THREE TIMES A WEEK

## 2024-06-20 NOTE — COMMUNITY RESOURCES LIST (ENGLISH)
June 20, 2024           YOUR PERSONALIZED LIST OF SERVICES & PROGRAMS           & SHELTER    Housing      [facility] Access - Emergency housing  [address] [place] [address] (Distance: 2.5 miles)  Language: English  Fee: Free      [place] - [place] - Coordinated Access - Coordinated Entry access point  [address] [place] [address] (Distance: 2.5 miles)  Phone: [phone]  Language: English  Fee: Free      [facility] - [employer] [facility]  Phone: [phone]  Website: https://www.[employer]/  Language: English, Hmong, Oromo, Panamanian, German  Hours: Mon 9:00 AM - 5:00 PM Tue 9:00 AM - 5:00 PM Wed 9:00 AM - 5:00 PM Thu 9:00 AM - 5:00 PM Fri 9:00 AM - 5:00 PM  Fee: Insurance  Accessibility: Blind accommodation, Deaf or hard of hearing, Translation services  Transportation Options: Free transportation    Case Management      [address] [address] (Distance: 2.5 miles)  Phone: [phone]  Language: English  Fee: Free  Accessibility: Translation services, Ada accessible      [facility] - Housing search assistance  [address] [place] [address] (Distance: 2.6 miles)  Phone: [phone]  Language: English, Italian, Serbian, Hmong  Fee: Free  Accessibility: Ada accessible, Blind accommodation, Deaf or hard of hearing, Translation services      [employer] - Housing Stabilization Services  Phone: [phone]  Website: https://homebasemn.com/  Language: English  Hours: Mon 8:00 AM - 4:00 PM Tue 8:00 AM - 4:00 PM Wed 8:00 AM - 4:00 PM Thu 8:00 AM - 4:00 PM Fri 8:00 AM - 4:00 PM  Fee: Free  Accessibility: Blind accommodation, Deaf or hard of hearing  Transportation Options: Free transportation    Drop-In Services      [facility] - Warming or cooling center - [facility] - [facility]  [address] [place] [address] (Distance: 2.7  miles)  Language: English  Fee: Free      [facility] - [facility]  [address] [place] [address] (Distance: 5.2 miles)  Phone: [phone]  Language: English  Fee: Free      [facility] POSTAL SERVICE - MAIL SERVICE FOR THE HOMELESS  Phone: [phone]  Website: https://www.groSolar               IMPORTANT NUMBERS & WEBSITES        Emergency Services  911  .   United Way  211 http://211unitedway.org  .   Poison Control  (284) 505-2820 http://[place]poison.org http://wisconsinpoison.org  .     Suicide and Crisis Lifeline  988 http://988Liquidnetline.org  .   [employer] Child Abuse Hotline  989.700.4412 http://Childhelphotline.org   .   [place] Sexual Assault Hotline  (921) 968-5403 (HOPE) http://KeepRecipes.Kili   .     [place] Runaway Safeline  (218) 174-9999 (RUNAWAY) http://FormattaruRacktivity.Kili  .   Pregnancy & Postpartum Support  Call/text 483-042-1363  [place]: http://ppsupport[place].org  [place]: http://Big Game Hunters.com/wi  .   Substance Abuse National Helpline ([facility])  189-735-HELP (9585) http://Findtreatment.gov   .                DISCLAIMER: These resources have been generated via the [employer] Platform. [employer] does not endorse any service providers mentioned in this resource list. [employer] does not guarantee that the services mentioned in this resource list will be available to you or will improve your health or wellness.    [facility]

## 2024-06-21 ENCOUNTER — OFFICE VISIT (OUTPATIENT)
Dept: FAMILY MEDICINE | Facility: CLINIC | Age: 54
End: 2024-06-21
Payer: COMMERCIAL

## 2024-06-21 VITALS
RESPIRATION RATE: 18 BRPM | OXYGEN SATURATION: 99 % | SYSTOLIC BLOOD PRESSURE: 120 MMHG | HEIGHT: 63 IN | BODY MASS INDEX: 30.07 KG/M2 | TEMPERATURE: 97 F | WEIGHT: 169.7 LBS | HEART RATE: 66 BPM | DIASTOLIC BLOOD PRESSURE: 82 MMHG

## 2024-06-21 DIAGNOSIS — I10 ESSENTIAL HYPERTENSION: ICD-10-CM

## 2024-06-21 DIAGNOSIS — E03.4 HYPOTHYROIDISM DUE TO ACQUIRED ATROPHY OF THYROID: ICD-10-CM

## 2024-06-21 DIAGNOSIS — Z00.00 ROUTINE GENERAL MEDICAL EXAMINATION AT A HEALTH CARE FACILITY: Primary | ICD-10-CM

## 2024-06-21 DIAGNOSIS — Z13.0 SCREENING FOR IRON DEFICIENCY ANEMIA: ICD-10-CM

## 2024-06-21 DIAGNOSIS — Z13.220 LIPID SCREENING: ICD-10-CM

## 2024-06-21 DIAGNOSIS — Z97.5 IUD (INTRAUTERINE DEVICE) IN PLACE: ICD-10-CM

## 2024-06-21 DIAGNOSIS — N95.1 MENOPAUSAL SYNDROME (HOT FLASHES): ICD-10-CM

## 2024-06-21 DIAGNOSIS — R61 NIGHT SWEATS: ICD-10-CM

## 2024-06-21 LAB
ALBUMIN SERPL BCG-MCNC: 4.3 G/DL (ref 3.5–5.2)
ALP SERPL-CCNC: 71 U/L (ref 40–150)
ALT SERPL W P-5'-P-CCNC: 12 U/L (ref 0–50)
ANION GAP SERPL CALCULATED.3IONS-SCNC: 9 MMOL/L (ref 7–15)
AST SERPL W P-5'-P-CCNC: 23 U/L (ref 0–45)
BILIRUB SERPL-MCNC: 0.4 MG/DL
BUN SERPL-MCNC: 16.3 MG/DL (ref 6–20)
CALCIUM SERPL-MCNC: 9.6 MG/DL (ref 8.6–10)
CHLORIDE SERPL-SCNC: 101 MMOL/L (ref 98–107)
CHOLEST SERPL-MCNC: 248 MG/DL
CREAT SERPL-MCNC: 0.83 MG/DL (ref 0.51–0.95)
DEPRECATED HCO3 PLAS-SCNC: 25 MMOL/L (ref 22–29)
EGFRCR SERPLBLD CKD-EPI 2021: 83 ML/MIN/1.73M2
ERYTHROCYTE [DISTWIDTH] IN BLOOD BY AUTOMATED COUNT: 12 % (ref 10–15)
FASTING STATUS PATIENT QL REPORTED: YES
FASTING STATUS PATIENT QL REPORTED: YES
GLUCOSE SERPL-MCNC: 92 MG/DL (ref 70–99)
HCT VFR BLD AUTO: 44.3 % (ref 35–47)
HDLC SERPL-MCNC: 78 MG/DL
HGB BLD-MCNC: 15 G/DL (ref 11.7–15.7)
LDLC SERPL CALC-MCNC: 157 MG/DL
MCH RBC QN AUTO: 28.8 PG (ref 26.5–33)
MCHC RBC AUTO-ENTMCNC: 33.9 G/DL (ref 31.5–36.5)
MCV RBC AUTO: 85 FL (ref 78–100)
NONHDLC SERPL-MCNC: 170 MG/DL
PLATELET # BLD AUTO: 226 10E3/UL (ref 150–450)
POTASSIUM SERPL-SCNC: 4.4 MMOL/L (ref 3.4–5.3)
PROT SERPL-MCNC: 7.3 G/DL (ref 6.4–8.3)
RBC # BLD AUTO: 5.21 10E6/UL (ref 3.8–5.2)
SODIUM SERPL-SCNC: 135 MMOL/L (ref 135–145)
TRIGL SERPL-MCNC: 66 MG/DL
TSH SERPL DL<=0.005 MIU/L-ACNC: 0.73 UIU/ML (ref 0.3–4.2)
WBC # BLD AUTO: 4.8 10E3/UL (ref 4–11)

## 2024-06-21 PROCEDURE — 85027 COMPLETE CBC AUTOMATED: CPT | Performed by: NURSE PRACTITIONER

## 2024-06-21 PROCEDURE — 99396 PREV VISIT EST AGE 40-64: CPT | Mod: 25 | Performed by: NURSE PRACTITIONER

## 2024-06-21 PROCEDURE — 90746 HEPB VACCINE 3 DOSE ADULT IM: CPT | Performed by: NURSE PRACTITIONER

## 2024-06-21 PROCEDURE — 84443 ASSAY THYROID STIM HORMONE: CPT | Performed by: NURSE PRACTITIONER

## 2024-06-21 PROCEDURE — 80053 COMPREHEN METABOLIC PANEL: CPT | Performed by: NURSE PRACTITIONER

## 2024-06-21 PROCEDURE — 36415 COLL VENOUS BLD VENIPUNCTURE: CPT | Performed by: NURSE PRACTITIONER

## 2024-06-21 PROCEDURE — 90471 IMMUNIZATION ADMIN: CPT | Performed by: NURSE PRACTITIONER

## 2024-06-21 PROCEDURE — 80061 LIPID PANEL: CPT | Performed by: NURSE PRACTITIONER

## 2024-06-21 PROCEDURE — 99214 OFFICE O/P EST MOD 30 MIN: CPT | Mod: 25 | Performed by: NURSE PRACTITIONER

## 2024-06-21 RX ORDER — LEVOTHYROXINE SODIUM 100 UG/1
100 TABLET ORAL DAILY
Qty: 90 TABLET | Refills: 0 | Status: SHIPPED | OUTPATIENT
Start: 2024-06-21

## 2024-06-21 RX ORDER — GABAPENTIN 300 MG/1
300 CAPSULE ORAL 3 TIMES DAILY
Qty: 90 CAPSULE | Refills: 3 | Status: SHIPPED | OUTPATIENT
Start: 2024-06-21

## 2024-06-21 RX ORDER — LISINOPRIL 5 MG/1
5 TABLET ORAL DAILY
Qty: 90 TABLET | Refills: 0 | Status: SHIPPED | OUTPATIENT
Start: 2024-06-21

## 2024-06-21 NOTE — PROGRESS NOTES
Preventive Care Visit  [facility]  Kimberly Guzmán, BOOM, Nurse Practitioner Primary Care  2024      Assessment & Plan     Routine general medical examination at a health care facility  Reviewed past medical/social/family history and health maintenance.  Will administer Hep B vaccine today. Would like to defer COVID-19 vaccine until after upcoming trip.    Essential hypertension  Stable. BP below goal of 140/90. Continue taking lisinopril 5 mg once daily and diet/exercise lifestyle modifications. Will also check CMP today.  - lisinopril (ZESTRIL) 5 MG tablet  Dispense: 90 tablet; Refill: 0  - Comprehensive metabolic panel (BMP + Alb, Alk Phos, ALT, AST, Total. Bili, TP)    Hypothyroidism due to acquired atrophy of thyroid  Stable. Continue taking levothyroxine 100 mcg once daily. Will also check TSH today.  - levothyroxine (SYNTHROID/LEVOTHROID) 100 MCG tablet  Dispense: 90 tablet; Refill: 0  - TSH WITH FREE T4 REFLEX    Menopausal syndrome (hot flashes)  Patient has been experiencing daily hot flashes and night sweats. Suspect these symptoms are secondary to menopause. Thoroughly reviewed treatment options with patient, including SSRIs/SNRIs, hormone replacement therapy, and gabapentin. Patient agreeable to starting gabapentin at night to help control symptoms. If symptoms continue to persist or worsen, will consider initiating additional treatment.    Night sweats  See above.  - gabapentin (NEURONTIN) 300 MG capsule  Dispense: 90 capsule; Refill: 3    IUD (intrauterine device) in place  Mirena in place, due to  in 2024. Will plan to remove at next visit -- advised patient to premedicate with ibuprofen.    Lipid screening  - Lipid panel reflex to direct LDL Fasting    Screening for iron deficiency anemia  - CBC with platelets      Patient has been advised of split billing requirements and indicates understanding: Yes      BMI  Estimated body mass index is 29.62 kg/m   "as calculated from the following:    Height as of this encounter: 1.612 m (5' 3.47\").    Weight as of this encounter: 77 kg (169 lb 11.2 oz).   Weight management plan: Discussed healthy diet and exercise guidelines    Counseling  Appropriate preventive services were discussed with this patient, including applicable screening as appropriate for fall prevention, nutrition, physical activity, Tobacco-use cessation, weight loss and cognition.  Checklist reviewing preventive services available has been given to the patient.  Reviewed patient's diet, addressing concerns and/or questions.   She is at risk for psychosocial distress and has been provided with information to reduce risk.     Jeana Mehta is a 53 year old, presenting for the following:  Physical        6/21/2024     9:11 AM   Additional Questions   Roomed by maite   Accompanied by self        Health Care Directive  Patient does not have a Health Care Directive or Living Will: Patient states has Advance Directive and will bring in a copy to clinic.    HPI    HTN: stable, taking lisinopril 5 mg once daily. Does not regularly check BP at home but denies symptoms of dizziness, lightheadedness, headaches, or blurry vision.    Hypothyroidism: stable, taking levothyroxine 100 mcg once daily    Menopause symptoms: experiencing hot flashes during the day and night. Mirena IUD currently in place, due to be removed soon and does not want replacement.         6/20/2024   General Health   How would you rate your overall physical health? Good   Feel stress (tense, anxious, or unable to sleep) Only a little      (!) STRESS CONCERN      6/20/2024   Nutrition   Three or more servings of calcium each day? (!) NO   Diet: Regular (no restrictions)   How many servings of fruit and vegetables per day? (!) 2-3   How many sweetened beverages each day? 0-1            6/20/2024   Exercise   Days per week of moderate/strenous exercise 7 days   Average minutes spent exercising at this " level 60 min            6/20/2024   Social Factors   Frequency of gathering with friends or relatives More than three times a week   Worry food won't last until get money to buy more No   Food not last or not have enough money for food? No   Do you have housing? (Housing is defined as stable permanent housing and does not include staying ouside in a car, in a tent, in an abandoned building, in an overnight shelter, or couch-surfing.) No   Are you worried about losing your housing? No   Lack of transportation? No   Unable to get utilities (heat,electricity)? No   Want help with housing or utility concern? No      (!) HOUSING CONCERN PRESENT      6/20/2024   Fall Risk   Fallen 2 or more times in the past year? No   Trouble with walking or balance? No             6/20/2024   Dental   Dentist two times every year? Yes            6/20/2024   TB Screening   Were you born outside of the [place]? No            Today's PHQ-2 Score:       6/20/2024    11:11 AM   PHQ-2 ( 1999 [employer])   Q1: Little interest or pleasure in doing things 0   Q2: Feeling down, depressed or hopeless 0   PHQ-2 Score 0   Q1: Little interest or pleasure in doing things Not at all   Q2: Feeling down, depressed or hopeless Not at all   PHQ-2 Score 0           6/20/2024   Substance Use   Alcohol more than 3/day or more than 7/wk No   Do you use any other substances recreationally? (!) ALCOHOL        Social History     Tobacco Use    Smoking status: Never    Smokeless tobacco: Never   Vaping Use    Vaping status: Never Used   Substance Use Topics    Alcohol use: Yes    Drug use: No           5/1/2024   LAST FHS-7 RESULTS   1st degree relative breast or ovarian cancer No   Any relative bilateral breast cancer Unknown   Any male have breast cancer No   Any ONE woman have BOTH breast AND ovarian cancer No   Any woman with breast cancer before 50yrs No   2 or more relatives with breast AND/OR ovarian cancer No   2 or more relatives with breast AND/OR bowel cancer No  "          Mammogram Screening - Mammogram every 1-2 years updated in Health Maintenance based on mutual decision making        6/20/2024   STI Screening   New sexual partner(s) since last STI/HIV test? No        History of abnormal Pap smear: No - age 30- 64 PAP with HPV every 5 years recommended        Latest Ref Rng & Units 4/26/2023     9:41 AM 4/3/2017    12:00 AM   PAP / HPV   PAP  Negative for Intraepithelial Lesion or Malignancy (NILM)     HPV 16 DNA Negative Negative     HPV 18 DNA Negative Negative     Other HR HPV Negative Negative     PAP-ABSTRACT   See Scanned Document           This result is from an external source.     ASCVD Risk   The 10-year ASCVD risk score (Barber BONILLA, et al., 2019) is: 1.9%    Values used to calculate the score:      Age: 53 years      Sex: Female      Is Non- : No      Diabetic: No      Tobacco smoker: No      Systolic Blood Pressure: 116 mmHg      Is BP treated: Yes      HDL Cholesterol: 75 mg/dL      Total Cholesterol: 250 mg/dL       Reviewed and updated as needed this visit by Provider                    Review of Systems  Constitutional, HEENT, cardiovascular, pulmonary, gi and gu systems are negative, except as otherwise noted.     Objective    Exam  There were no vitals taken for this visit.   Estimated body mass index is 30.32 kg/m  as calculated from the following:    Height as of 11/22/23: 1.613 m (5' 3.5\").    Weight as of 11/22/23: 78.9 kg (173 lb 14.4 oz).    Physical Exam  GENERAL: alert and no distress  EYES: Eyes grossly normal to inspection, PERRL and conjunctivae and sclerae normal  HENT: ear canals and TM's normal, nose and mouth without ulcers or lesions  NECK: no adenopathy, no asymmetry, masses, or scars  RESP: lungs clear to auscultation - no rales, rhonchi or wheezes  BREAST: normal without masses, tenderness or nipple discharge and no palpable axillary masses or adenopathy  CV: regular rate and rhythm, normal S1 S2, no S3 " or S4, no murmur, click or rub, no peripheral edema  ABDOMEN: soft, nontender, no hepatosplenomegaly, no masses and bowel sounds normal   (female): deferred  MS: no gross musculoskeletal defects noted, no edema  SKIN: no suspicious lesions or rashes  NEURO: Normal strength and tone, mentation intact and speech normal  PSYCH: mentation appears normal, affect normal/bright  LYMPH: no cervical, supraclavicular, axillary, or inguinal adenopathy      Key Mi DNP-S    Physician Attestation   I, Kimberly Guzmán DNP, was present with the medical/GRACE student who participated in the service and in the documentation of the note.  I have verified the history and personally performed the physical exam and medical decision making.  I agree with the assessment and plan of care as documented in the note.          Kimberly Guzmán DNP     Signed Electronically by: Kimberly Guzmán DNP

## 2024-06-21 NOTE — NURSING NOTE
Prior to immunization administration, verified patients identity using patient s name and date of birth. Please see Immunization Activity for additional information.     Screening Questionnaire for Adult Immunization    Are you sick today?   No   Do you have allergies to medications, food, a vaccine component or latex?   Yes   Have you ever had a serious reaction after receiving a vaccination?   No   Do you have a long-term health problem with heart, lung, kidney, or metabolic disease (e.g., diabetes), asthma, a blood disorder, no spleen, complement component deficiency, a cochlear implant, or a spinal fluid leak?  Are you on long-term aspirin therapy?   No   Do you have cancer, leukemia, HIV/AIDS, or any other immune system problem?   No   Do you have a parent, brother, or sister with an immune system problem?   No   In the past 3 months, have you taken medications that affect  your immune system, such as prednisone, other steroids, or anticancer drugs; drugs for the treatment of rheumatoid arthritis, Crohn s disease, or psoriasis; or have you had radiation treatments?   No   Have you had a seizure, or a brain or other nervous system problem?   No   During the past year, have you received a transfusion of blood or blood    products, or been given immune (gamma) globulin or antiviral drug?   No   For women: Are you pregnant or is there a chance you could become       pregnant during the next month?   No   Have you received any vaccinations in the past 4 weeks?   No     Immunization questionnaire was positive for at least one answer.  Di victor.      Patient instructed to remain in clinic for 15 minutes afterwards, and to report any adverse reactions.     Screening performed by Gogo Pérez MA on 6/21/2024 at 9:59 AM.

## 2024-07-25 ENCOUNTER — MYC MEDICAL ADVICE (OUTPATIENT)
Dept: FAMILY MEDICINE | Facility: CLINIC | Age: 54
End: 2024-07-25
Payer: COMMERCIAL

## 2024-07-25 DIAGNOSIS — M25.519 SHOULDER PAIN, UNSPECIFIED CHRONICITY, UNSPECIFIED LATERALITY: Primary | ICD-10-CM

## 2024-07-26 NOTE — TELEPHONE ENCOUNTER
Key - see Sheyla, PT referral pended below.    GRACIELA Carlson, BSN, RN (she/her)  [facility] RN

## 2024-09-16 NOTE — PROGRESS NOTES
Patient did not return for further treatment and no additional progress was noted.  Please refer to the progress note and goal flowsheet completed on  4/12/24 for discharge information.

## 2024-11-01 DIAGNOSIS — I10 ESSENTIAL HYPERTENSION: ICD-10-CM

## 2024-11-01 RX ORDER — LISINOPRIL 5 MG/1
5 TABLET ORAL DAILY
Qty: 90 TABLET | Refills: 0 | Status: SHIPPED | OUTPATIENT
Start: 2024-11-01

## 2024-11-02 DIAGNOSIS — E03.4 HYPOTHYROIDISM DUE TO ACQUIRED ATROPHY OF THYROID: ICD-10-CM

## 2024-11-02 RX ORDER — LEVOTHYROXINE SODIUM 100 UG/1
100 TABLET ORAL DAILY
Qty: 90 TABLET | Refills: 1 | Status: SHIPPED | OUTPATIENT
Start: 2024-11-02

## 2024-11-04 ENCOUNTER — TRANSFERRED RECORDS (OUTPATIENT)
Dept: HEALTH INFORMATION MANAGEMENT | Facility: CLINIC | Age: 54
End: 2024-11-04
Payer: COMMERCIAL

## 2024-11-26 ENCOUNTER — OFFICE VISIT (OUTPATIENT)
Dept: FAMILY MEDICINE | Facility: CLINIC | Age: 54
End: 2024-11-26
Payer: COMMERCIAL

## 2024-11-26 VITALS
RESPIRATION RATE: 12 BRPM | HEART RATE: 52 BPM | HEIGHT: 64 IN | DIASTOLIC BLOOD PRESSURE: 80 MMHG | BODY MASS INDEX: 29.65 KG/M2 | SYSTOLIC BLOOD PRESSURE: 116 MMHG | WEIGHT: 173.7 LBS | TEMPERATURE: 97.2 F | OXYGEN SATURATION: 98 %

## 2024-11-26 DIAGNOSIS — Z30.432 ENCOUNTER FOR IUD REMOVAL: ICD-10-CM

## 2024-11-26 DIAGNOSIS — N95.1 MENOPAUSAL SYNDROME (HOT FLASHES): Primary | ICD-10-CM

## 2024-11-26 PROCEDURE — 58301 REMOVE INTRAUTERINE DEVICE: CPT | Performed by: PHYSICIAN ASSISTANT

## 2024-11-26 PROCEDURE — 99214 OFFICE O/P EST MOD 30 MIN: CPT | Mod: 25 | Performed by: PHYSICIAN ASSISTANT

## 2024-11-26 ASSESSMENT — PAIN SCALES - GENERAL: PAINLEVEL_OUTOF10: NO PAIN (0)

## 2024-11-26 NOTE — PROGRESS NOTES
"  Assessment & Plan     Menopausal syndrome (hot flashes) - reviewed options & risks, she is open to starting estrogen therapy. She is open to transdermal route to minimize VTE risk. Follow up yearly or sooner if incomplete symptom control. Did also discuss she may need separate medication for insomnia.  - estradiol-norethindrone (COMBIPATCH) 0.05-0.14 MG/DAY bi-weekly patch; Place 1 patch over 96 hours onto the skin twice a week.    Encounter for IUD removal - see procedure note  - REMOVE INTRAUTERINE DEVICE      The longitudinal plan of care for the diagnosis(es)/condition(s) as documented were addressed during this visit. Due to the added complexity in care, I will continue to support Janine in the subsequent management and with ongoing continuity of care.      Subjective   Janine is a 54 year old, presenting for the following health issues:  IUD (Removal /) and HRT (Discuss about HRT)        11/26/2024    11:04 AM   Additional Questions   Roomed by Jh Campa   Accompanied by Self     Janine here today for IUD removal, HRT    Discussed menopausal hot flashes, insomnia at last visit in 6/2024  Started on gabapentin 300mg hs  This has reduced night sweats, helped a bit with sleep  Still having daytime hot flashes  Still struggles with sleep from time to time    IUD    History of Present Illness       Reason for visit:  Remove IUD   She is taking medications regularly.         Objective    /80   Pulse 52   Temp 97.2  F (36.2  C) (Temporal)   Resp 12   Ht 1.613 m (5' 3.5\")   Wt 78.8 kg (173 lb 11.2 oz)   SpO2 98%   BMI 30.29 kg/m    Body mass index is 30.29 kg/m .  Physical Exam   GENERAL: alert and no distress  PSYCH: mentation appears normal, affect normal/bright        Signed Electronically by: Key Maria PA-C    Answers submitted by the patient for this visit:  General Questionnaire (Submitted on 11/25/2024)  Chief Complaint: Chronic problems general questions HPI Form  What is the reason for your visit " today? : Remove IUD  How many days per week do you miss taking your medication?: 0  Questionnaire about: Chronic problems general questions HPI Form (Submitted on 11/25/2024)  Chief Complaint: Chronic problems general questions HPI Form

## 2024-11-26 NOTE — PROGRESS NOTES
IUD Removal:  SUBJECTIVE:    Is a pregnancy test required: No.  Was a consent obtained?  Yes    Janine Beverly is a 54 year old female,No obstetric history on file., No LMP recorded. (Menstrual status: IUD). who presents today for IUD removal. She has not had problems with the IUD. She requests removal of the IUD because the IUD effectiveness has     Today's PHQ-2 Score:       2024    11:11 AM   PHQ-2 (  Pfizer)   Q1: Little interest or pleasure in doing things 0    Q2: Feeling down, depressed or hopeless 0    PHQ-2 Score 0   Q1: Little interest or pleasure in doing things Not at all   Q2: Feeling down, depressed or hopeless Not at all   PHQ-2 Score 0       Patient-reported       PROCEDURE:    A speculum exam was performed and the cervix was visualized. The IUD string was visualized. Using ring forceps, the string  was grasped and the IUD removed intact.    POST PROCEDURE:    The patient tolerated the procedure well. Patient was discharged in stable condition.    Call if bleeding, pain or fever occur.    Key Maria PA-C

## 2025-04-30 DIAGNOSIS — E03.4 HYPOTHYROIDISM DUE TO ACQUIRED ATROPHY OF THYROID: ICD-10-CM

## 2025-04-30 RX ORDER — LEVOTHYROXINE SODIUM 100 UG/1
100 TABLET ORAL DAILY
Qty: 90 TABLET | Refills: 0 | Status: SHIPPED | OUTPATIENT
Start: 2025-04-30

## 2025-05-01 DIAGNOSIS — I10 ESSENTIAL HYPERTENSION: ICD-10-CM

## 2025-05-01 RX ORDER — LISINOPRIL 5 MG/1
5 TABLET ORAL DAILY
Qty: 90 TABLET | Refills: 1 | Status: SHIPPED | OUTPATIENT
Start: 2025-05-01

## 2025-05-05 ENCOUNTER — ANCILLARY PROCEDURE (OUTPATIENT)
Dept: MAMMOGRAPHY | Facility: CLINIC | Age: 55
End: 2025-05-05
Attending: PHYSICIAN ASSISTANT
Payer: COMMERCIAL

## 2025-05-05 DIAGNOSIS — Z12.31 VISIT FOR SCREENING MAMMOGRAM: ICD-10-CM

## 2025-05-05 PROCEDURE — 77067 SCR MAMMO BI INCL CAD: CPT | Performed by: RADIOLOGY

## 2025-05-05 PROCEDURE — 77063 BREAST TOMOSYNTHESIS BI: CPT | Performed by: RADIOLOGY

## 2025-06-24 NOTE — NURSING NOTE
Dermatology Rooming Note    Janine Beverly's goals for this visit include:   Chief Complaint   Patient presents with     Derm Problem     Janine is here today for a lesion of concern on her lip     Nilda Resendiz, CMA    oral

## 2025-07-05 SDOH — HEALTH STABILITY: PHYSICAL HEALTH: ON AVERAGE, HOW MANY DAYS PER WEEK DO YOU ENGAGE IN MODERATE TO STRENUOUS EXERCISE (LIKE A BRISK WALK)?: 6 DAYS

## 2025-07-05 SDOH — HEALTH STABILITY: PHYSICAL HEALTH: ON AVERAGE, HOW MANY MINUTES DO YOU ENGAGE IN EXERCISE AT THIS LEVEL?: 60 MIN

## 2025-07-05 ASSESSMENT — SOCIAL DETERMINANTS OF HEALTH (SDOH): HOW OFTEN DO YOU GET TOGETHER WITH FRIENDS OR RELATIVES?: TWICE A WEEK

## 2025-07-10 ENCOUNTER — MYC MEDICAL ADVICE (OUTPATIENT)
Dept: FAMILY MEDICINE | Facility: CLINIC | Age: 55
End: 2025-07-10

## 2025-07-10 ENCOUNTER — OFFICE VISIT (OUTPATIENT)
Dept: FAMILY MEDICINE | Facility: CLINIC | Age: 55
End: 2025-07-10
Payer: COMMERCIAL

## 2025-07-10 VITALS
SYSTOLIC BLOOD PRESSURE: 137 MMHG | TEMPERATURE: 97.2 F | WEIGHT: 177.3 LBS | HEIGHT: 63 IN | HEART RATE: 56 BPM | DIASTOLIC BLOOD PRESSURE: 88 MMHG | OXYGEN SATURATION: 99 % | BODY MASS INDEX: 31.41 KG/M2 | RESPIRATION RATE: 20 BRPM

## 2025-07-10 DIAGNOSIS — I10 ESSENTIAL HYPERTENSION: ICD-10-CM

## 2025-07-10 DIAGNOSIS — E03.4 HYPOTHYROIDISM DUE TO ACQUIRED ATROPHY OF THYROID: ICD-10-CM

## 2025-07-10 DIAGNOSIS — Z00.00 ROUTINE GENERAL MEDICAL EXAMINATION AT A HEALTH CARE FACILITY: Primary | ICD-10-CM

## 2025-07-10 DIAGNOSIS — Z23 ENCOUNTER FOR IMMUNIZATION: ICD-10-CM

## 2025-07-10 RX ORDER — LEVOTHYROXINE SODIUM 100 UG/1
100 TABLET ORAL DAILY
Qty: 90 TABLET | Refills: 0 | Status: SHIPPED | OUTPATIENT
Start: 2025-07-10 | End: 2025-07-10

## 2025-07-10 RX ORDER — LEVOTHYROXINE SODIUM 100 UG/1
100 TABLET ORAL DAILY
Qty: 90 TABLET | Refills: 3 | Status: SHIPPED | OUTPATIENT
Start: 2025-07-10

## 2025-07-10 ASSESSMENT — PAIN SCALES - GENERAL: PAINLEVEL_OUTOF10: NO PAIN (0)

## 2025-07-10 NOTE — PATIENT INSTRUCTIONS
Patient Education   Preventive Care Advice   This is general advice given by our system to help you stay healthy. However, your care team may have specific advice just for you. Please talk to your care team about your preventive care needs.  Nutrition  Eat 5 or more servings of fruits and vegetables each day.  Try wheat bread, brown rice and whole grain pasta (instead of white bread, rice, and pasta).  Get enough calcium and vitamin D. Check the label on foods and aim for 100% of the RDA (recommended daily allowance).  Lifestyle  Exercise at least 150 minutes each week  (30 minutes a day, 5 days a week).  Do muscle strengthening activities 2 days a week. These help control your weight and prevent disease.  No smoking.  Wear sunscreen to prevent skin cancer.  Have a dental exam and cleaning every 6 months.  Yearly exams  See your health care team every year to talk about:  Any changes in your health.  Any medicines your care team has prescribed.  Preventive care, family planning, and ways to prevent chronic diseases.  Shots (vaccines)   HPV shots (up to age 26), if you've never had them before.  Hepatitis B shots (up to age 59), if you've never had them before.  COVID-19 shot: Get this shot when it's due.  Flu shot: Get a flu shot every year.  Tetanus shot: Get a tetanus shot every 10 years.  Pneumococcal, hepatitis A, and RSV shots: Ask your care team if you need these based on your risk.  Shingles shot (for age 50 and up)  General health tests  Diabetes screening:  Starting at age 35, Get screened for diabetes at least every 3 years.  If you are younger than age 35, ask your care team if you should be screened for diabetes.  Cholesterol test: At age 39, start having a cholesterol test every 5 years, or more often if advised.  Bone density scan (DEXA): At age 50, ask your care team if you should have this scan for osteoporosis (brittle bones).  Hepatitis C: Get tested at least once in your life.  STIs (sexually  transmitted infections)  Before age 24: Ask your care team if you should be screened for STIs.  After age 24: Get screened for STIs if you're at risk. You are at risk for STIs (including HIV) if:  You are sexually active with more than one person.  You don't use condoms every time.  You or a partner was diagnosed with a sexually transmitted infection.  If you are at risk for HIV, ask about PrEP medicine to prevent HIV.  Get tested for HIV at least once in your life, whether you are at risk for HIV or not.  Cancer screening tests  Cervical cancer screening: If you have a cervix, begin getting regular cervical cancer screening tests starting at age 21.  Breast cancer scan (mammogram): If you've ever had breasts, begin having regular mammograms starting at age 40. This is a scan to check for breast cancer.  Colon cancer screening: It is important to start screening for colon cancer at age 45.  Have a colonoscopy test every 10 years (or more often if you're at risk) Or, ask your provider about stool tests like a FIT test every year or Cologuard test every 3 years.  To learn more about your testing options, visit:   .  For help making a decision, visit:   https://bit.ly/bl42043.  Prostate cancer screening test: If you have a prostate, ask your care team if a prostate cancer screening test (PSA) at age 55 is right for you.  Lung cancer screening: If you are a current or former smoker ages 50 to 80, ask your care team if ongoing lung cancer screenings are right for you.  For informational purposes only. Not to replace the advice of your health care provider. Copyright   2023 [employer]. All rights reserved. Clinically reviewed by the [facility] Transitions Program. Best Teacher [id number] - REV 01/24.

## 2025-07-10 NOTE — PROGRESS NOTES
"Preventive Care Visit  [facility]  Key Maria PA-C, Physician Assistant - Medical  Jul 10, 2025      Assessment & Plan     Routine general medical examination at a health care facility  - REVIEW OF HEALTH MAINTENANCE PROTOCOL ORDERS  - CBC with platelets; Future  - Basic metabolic panel; Future  - Lipid panel reflex to direct LDL Fasting; Future  - PRIMARY CARE FOLLOW-UP SCHEDULING; Future  - Adult Dermatology  Referral; Future    Hypothyroidism due to acquired atrophy of thyroid - monitoring labs due, refills sent. Follow up yearly.  - TSH WITH FREE T4 REFLEX; Future  - levothyroxine (SYNTHROID/LEVOTHROID) 100 MCG tablet; Take 1 tablet (100 mcg) by mouth daily.    Essential hypertension - BP elevated today. Will monitor home Bps over next month and [facility] any dose adjustment.    Encounter for immunization  - Pneumococcal 20 Valent Conjugate (PCV20)  - HEPATITIS B VACCINE (20 YEARS AND OLDER) (ENGERIX-B/RECOMBIVAX)    BMI  Estimated body mass index is 31.41 kg/m  as calculated from the following:    Height as of this encounter: 1.6 m (5' 3\").    Weight as of this encounter: 80.4 kg (177 lb 4.8 oz).     Counseling  Appropriate preventive services were addressed with this patient via screening, questionnaire, or discussion as appropriate for fall prevention, nutrition, physical activity, Tobacco-use cessation, social engagement, weight loss and cognition.  Checklist reviewing preventive services available has been given to the patient.  Reviewed patient's diet, addressing concerns and/or questions.     The longitudinal plan of care for the diagnosis(es)/condition(s) as documented were addressed during this visit. Due to the added complexity in care, I will continue to support Janine in the subsequent management and with ongoing continuity of care.      Subjective   Janine is a 55 year old, presenting for the following:  Physical and Referral (DERM for skin " scan)    Janine here today for [facility] visit    Blood pressure was high for last time she donated blood  High today  Does have new blood pressure cuff but hasn't checked at home    Otherwise doing well & no concerns  HPI  Advance Care Planning    Discussed advance care planning with patient; informed AVS has link to [facility].        7/5/2025   General Health   How would you rate your overall physical health? Good   Feel stress (tense, anxious, or unable to sleep) Only a little   (!) STRESS CONCERN      7/5/2025   Nutrition   Three or more servings of calcium each day? (!) NO   Diet: Regular (no restrictions)   How many servings of fruit and vegetables per day? (!) 2-3   How many sweetened beverages each day? 0-1         7/5/2025   Exercise   Days per week of moderate/strenous exercise 6 days   Average minutes spent exercising at this level 60 min         7/5/2025   Social Factors   Frequency of gathering with friends or relatives Twice a week   Worry food won't last until get money to buy more No   Food not last or not have enough money for food? No   Do you have housing? (Housing is defined as stable permanent housing and does not include staying outside in a car, in a tent, in an abandoned building, in an overnight shelter, or couch-surfing.) Yes   Are you worried about losing your housing? No   Lack of transportation? No   Unable to get utilities (heat,electricity)? No         7/5/2025   Fall Risk   Fallen 2 or more times in the past year? No   Trouble with walking or balance? No          7/5/2025   Dental   Dentist two times every year? Yes     Today's PHQ-2 Score:       7/10/2025    12:25 PM   PHQ-2 ( 1999 [employer])   Q1: Little interest or pleasure in doing things 0   Q2: Feeling down, depressed or hopeless 0   PHQ-2 Score 0    Q1: Little interest or pleasure in doing things Not at all   Q2: Feeling down, depressed or hopeless Not at all   PHQ-2 Score 0       Patient-reported         7/5/2025   Substance Use    Alcohol more than 3/day or more than 7/wk No   Do you use any other substances recreationally? (!) OTHER     Social History     Tobacco Use    Smoking status: Never    Smokeless tobacco: Never   Vaping Use    Vaping status: Never Used   Substance Use Topics    Alcohol use: Yes    Drug use: No           5/5/2025   LAST FHS-7 RESULTS   1st degree relative breast or ovarian cancer No   Any relative bilateral breast cancer Unknown   Any male have breast cancer No   Any ONE woman have BOTH breast AND ovarian cancer No   Any woman with breast cancer before 50yrs No   2 or more relatives with breast AND/OR ovarian cancer No   2 or more relatives with breast AND/OR bowel cancer No        Mammogram Screening - Mammogram every 1-2 years updated in Health Maintenance based on mutual decision making        7/5/2025   STI Screening   New sexual partner(s) since last STI/HIV test? No     History of abnormal Pap smear: No - age 30- 64 PAP with HPV every 5 years recommended        Latest Ref Rng & Units 4/26/2023     9:41 AM 4/3/2017    12:00 AM   PAP / HPV   PAP  Negative for Intraepithelial Lesion or Malignancy (NILM)     HPV 16 DNA Negative Negative     HPV 18 DNA Negative Negative     Other HR HPV Negative Negative     PAP-ABSTRACT   See Scanned Document      ASCVD Risk   The 10-year ASCVD risk score (Barber BONILLA, et al., 2019) is: 3.1%    Values used to calculate the score:      Age: 55 years      Sex: Female      Is Non- : No      Diabetic: No      Tobacco smoker: No      Systolic Blood Pressure: 137 mmHg      Is BP treated: Yes      HDL Cholesterol: 78 mg/dL      Total Cholesterol: 248 mg/dL       Reviewed and updated as needed this visit by Provider   Tobacco  Allergies  Meds  Problems  Med Hx  Surg Hx  Fam Hx               Objective    Exam  /88 (BP Location: Right arm, Patient Position: Sitting, Cuff Size: Adult Regular)   Pulse 56   Temp 97.2  F (36.2  C) (Temporal)   Resp  "20   Ht 1.6 m (5' 3\")   Wt 80.4 kg (177 lb 4.8 oz)   SpO2 99%   BMI 31.41 kg/m     Estimated body mass index is 31.41 kg/m  as calculated from the following:    Height as of this encounter: 1.6 m (5' 3\").    Weight as of this encounter: 80.4 kg (177 lb 4.8 oz).    Physical Exam  GENERAL: alert and no distress  EYES: Eyes grossly normal to inspection, PERRL and conjunctivae and sclerae normal  HENT: ear canals and TM's normal, nose and mouth without ulcers or lesions  NECK: no adenopathy, no asymmetry, masses, or scars  RESP: lungs clear to auscultation - no rales, rhonchi or wheezes  CV: regular rate and rhythm, normal S1 S2, no S3 or S4, no murmur, click or rub, no peripheral edema  ABDOMEN: soft, nontender, no hepatosplenomegaly, no masses and bowel sounds normal  MS: no gross musculoskeletal defects noted, no edema  SKIN: no suspicious lesions or rashes  NEURO: Normal strength and tone, mentation intact and speech normal  PSYCH: mentation appears normal, affect normal/bright        Signed Electronically by: Key Maria PA-C    "

## 2025-07-10 NOTE — NURSING NOTE
Prior to immunization administration, verified patients identity using patient s name and date of birth. Please see Immunization Activity for additional information.     Screening Questionnaire for Adult Immunization    Are you sick today?   No   Do you have allergies to medications, food, a vaccine component or latex?   No   Have you ever had a serious reaction after receiving a vaccination?   No   Do you have a long-term health problem with heart, lung, kidney, or metabolic disease (e.g., diabetes), asthma, a blood disorder, no spleen, complement component deficiency, a cochlear implant, or a spinal fluid leak?  Are you on long-term aspirin therapy?   No   Do you have cancer, leukemia, HIV/AIDS, or any other immune system problem?   No   Do you have a parent, brother, or sister with an immune system problem?   No   In the past 3 months, have you taken medications that affect  your immune system, such as prednisone, other steroids, or anticancer drugs; drugs for the treatment of rheumatoid arthritis, Crohn s disease, or psoriasis; or have you had radiation treatments?   No   Have you had a seizure, or a brain or other nervous system problem?   No   During the past year, have you received a transfusion of blood or blood    products, or been given immune (gamma) globulin or antiviral drug?   No   For women: Are you pregnant or is there a chance you could become       pregnant during the next month?   No   Have you received any vaccinations in the past 4 weeks?   No     Immunization questionnaire answers were all negative.      Patient instructed to remain in clinic for 15 minutes afterwards, and to report any adverse reactions.     Screening performed by Emilia Sol MA on 7/10/2025 at 1:04 PM.

## 2025-07-23 ENCOUNTER — RESULTS FOLLOW-UP (OUTPATIENT)
Dept: FAMILY MEDICINE | Facility: CLINIC | Age: 55
End: 2025-07-23
Payer: COMMERCIAL

## 2025-07-23 DIAGNOSIS — E03.4 HYPOTHYROIDISM DUE TO ACQUIRED ATROPHY OF THYROID: Primary | ICD-10-CM

## 2025-07-23 NOTE — TELEPHONE ENCOUNTER
Sending med response to TORIE Iverson .  Daljit, Triage RN  [facility]/ [phone]

## 2025-07-24 RX ORDER — LEVOTHYROXINE SODIUM 112 UG/1
112 TABLET ORAL
Qty: 90 TABLET | Refills: 3 | Status: SHIPPED | OUTPATIENT
Start: 2025-07-24

## 2025-08-28 ENCOUNTER — TRANSFERRED RECORDS (OUTPATIENT)
Dept: HEALTH INFORMATION MANAGEMENT | Facility: CLINIC | Age: 55
End: 2025-08-28
Payer: COMMERCIAL

## 2025-09-03 ENCOUNTER — LAB (OUTPATIENT)
Dept: LAB | Facility: CLINIC | Age: 55
End: 2025-09-03
Payer: COMMERCIAL

## 2025-09-03 DIAGNOSIS — E03.4 HYPOTHYROIDISM DUE TO ACQUIRED ATROPHY OF THYROID: ICD-10-CM

## 2025-09-03 LAB — TSH SERPL DL<=0.005 MIU/L-ACNC: 1.42 UIU/ML (ref 0.3–4.2)

## 2025-09-03 PROCEDURE — 84443 ASSAY THYROID STIM HORMONE: CPT

## 2025-09-03 PROCEDURE — 36415 COLL VENOUS BLD VENIPUNCTURE: CPT
